# Patient Record
Sex: FEMALE | Race: WHITE | HISPANIC OR LATINO | Employment: PART TIME | URBAN - METROPOLITAN AREA
[De-identification: names, ages, dates, MRNs, and addresses within clinical notes are randomized per-mention and may not be internally consistent; named-entity substitution may affect disease eponyms.]

---

## 2021-12-22 ENCOUNTER — OFFICE VISIT (OUTPATIENT)
Dept: FAMILY MEDICINE CLINIC | Facility: CLINIC | Age: 36
End: 2021-12-22

## 2021-12-22 ENCOUNTER — LAB (OUTPATIENT)
Dept: LAB | Facility: HOSPITAL | Age: 36
End: 2021-12-22

## 2021-12-22 VITALS
HEART RATE: 79 BPM | BODY MASS INDEX: 41.09 KG/M2 | TEMPERATURE: 98.3 F | OXYGEN SATURATION: 97 % | HEIGHT: 69 IN | DIASTOLIC BLOOD PRESSURE: 81 MMHG | WEIGHT: 277.4 LBS | SYSTOLIC BLOOD PRESSURE: 129 MMHG

## 2021-12-22 DIAGNOSIS — Z11.59 NEED FOR HEPATITIS C SCREENING TEST: ICD-10-CM

## 2021-12-22 DIAGNOSIS — M05.79 RHEUMATOID ARTHRITIS INVOLVING MULTIPLE SITES WITH POSITIVE RHEUMATOID FACTOR (HCC): ICD-10-CM

## 2021-12-22 DIAGNOSIS — Z76.89 ESTABLISHING CARE WITH NEW DOCTOR, ENCOUNTER FOR: Primary | ICD-10-CM

## 2021-12-22 DIAGNOSIS — E11.9 TYPE 2 DIABETES MELLITUS WITHOUT COMPLICATION, WITHOUT LONG-TERM CURRENT USE OF INSULIN (HCC): ICD-10-CM

## 2021-12-22 DIAGNOSIS — Z13.29 SCREENING FOR THYROID DISORDER: ICD-10-CM

## 2021-12-22 DIAGNOSIS — Z01.89 ROUTINE LAB DRAW: ICD-10-CM

## 2021-12-22 DIAGNOSIS — J30.2 SEASONAL ALLERGIES: ICD-10-CM

## 2021-12-22 LAB
ALBUMIN SERPL-MCNC: 4.4 G/DL (ref 3.5–5.2)
ALBUMIN UR-MCNC: <1.2 MG/DL
ALBUMIN/GLOB SERPL: 1.7 G/DL
ALP SERPL-CCNC: 62 U/L (ref 39–117)
ALT SERPL W P-5'-P-CCNC: 42 U/L (ref 1–33)
ANION GAP SERPL CALCULATED.3IONS-SCNC: 9.3 MMOL/L (ref 5–15)
AST SERPL-CCNC: 22 U/L (ref 1–32)
BASOPHILS # BLD AUTO: 0.07 10*3/MM3 (ref 0–0.2)
BASOPHILS NFR BLD AUTO: 0.8 % (ref 0–1.5)
BILIRUB SERPL-MCNC: 0.5 MG/DL (ref 0–1.2)
BUN SERPL-MCNC: 11 MG/DL (ref 6–20)
BUN/CREAT SERPL: 14.7 (ref 7–25)
CALCIUM SPEC-SCNC: 9.3 MG/DL (ref 8.6–10.5)
CHLORIDE SERPL-SCNC: 105 MMOL/L (ref 98–107)
CHOLEST SERPL-MCNC: 155 MG/DL (ref 0–200)
CO2 SERPL-SCNC: 23.7 MMOL/L (ref 22–29)
CREAT SERPL-MCNC: 0.75 MG/DL (ref 0.57–1)
CREAT UR-MCNC: 144.4 MG/DL
DEPRECATED RDW RBC AUTO: 40.6 FL (ref 37–54)
EOSINOPHIL # BLD AUTO: 0.17 10*3/MM3 (ref 0–0.4)
EOSINOPHIL NFR BLD AUTO: 1.9 % (ref 0.3–6.2)
ERYTHROCYTE [DISTWIDTH] IN BLOOD BY AUTOMATED COUNT: 13.1 % (ref 12.3–15.4)
GFR SERPL CREATININE-BSD FRML MDRD: 87 ML/MIN/1.73
GLOBULIN UR ELPH-MCNC: 2.6 GM/DL
GLUCOSE SERPL-MCNC: 83 MG/DL (ref 65–99)
HBA1C MFR BLD: 5.49 % (ref 4.8–5.6)
HCT VFR BLD AUTO: 43 % (ref 34–46.6)
HDLC SERPL-MCNC: 51 MG/DL (ref 40–60)
HGB BLD-MCNC: 14.7 G/DL (ref 12–15.9)
IMM GRANULOCYTES # BLD AUTO: 0.03 10*3/MM3 (ref 0–0.05)
IMM GRANULOCYTES NFR BLD AUTO: 0.3 % (ref 0–0.5)
LDLC SERPL CALC-MCNC: 87 MG/DL (ref 0–100)
LDLC/HDLC SERPL: 1.67 {RATIO}
LYMPHOCYTES # BLD AUTO: 2.54 10*3/MM3 (ref 0.7–3.1)
LYMPHOCYTES NFR BLD AUTO: 28.1 % (ref 19.6–45.3)
MCH RBC QN AUTO: 29.5 PG (ref 26.6–33)
MCHC RBC AUTO-ENTMCNC: 34.2 G/DL (ref 31.5–35.7)
MCV RBC AUTO: 86.2 FL (ref 79–97)
MICROALBUMIN/CREAT UR: NORMAL MG/G{CREAT}
MONOCYTES # BLD AUTO: 0.5 10*3/MM3 (ref 0.1–0.9)
MONOCYTES NFR BLD AUTO: 5.5 % (ref 5–12)
NEUTROPHILS NFR BLD AUTO: 5.73 10*3/MM3 (ref 1.7–7)
NEUTROPHILS NFR BLD AUTO: 63.4 % (ref 42.7–76)
NRBC BLD AUTO-RTO: 0 /100 WBC (ref 0–0.2)
PLATELET # BLD AUTO: 292 10*3/MM3 (ref 140–450)
PMV BLD AUTO: 10.7 FL (ref 6–12)
POTASSIUM SERPL-SCNC: 4.6 MMOL/L (ref 3.5–5.2)
PROT SERPL-MCNC: 7 G/DL (ref 6–8.5)
RBC # BLD AUTO: 4.99 10*6/MM3 (ref 3.77–5.28)
SODIUM SERPL-SCNC: 138 MMOL/L (ref 136–145)
TRIGL SERPL-MCNC: 93 MG/DL (ref 0–150)
TSH SERPL DL<=0.05 MIU/L-ACNC: 2.15 UIU/ML (ref 0.27–4.2)
VLDLC SERPL-MCNC: 17 MG/DL (ref 5–40)
WBC NRBC COR # BLD: 9.04 10*3/MM3 (ref 3.4–10.8)

## 2021-12-22 PROCEDURE — 99204 OFFICE O/P NEW MOD 45 MIN: CPT | Performed by: NURSE PRACTITIONER

## 2021-12-22 PROCEDURE — 80061 LIPID PANEL: CPT

## 2021-12-22 PROCEDURE — 80050 GENERAL HEALTH PANEL: CPT | Performed by: NURSE PRACTITIONER

## 2021-12-22 PROCEDURE — 36415 COLL VENOUS BLD VENIPUNCTURE: CPT

## 2021-12-22 PROCEDURE — 83036 HEMOGLOBIN GLYCOSYLATED A1C: CPT

## 2021-12-22 PROCEDURE — 82570 ASSAY OF URINE CREATININE: CPT

## 2021-12-22 PROCEDURE — 82043 UR ALBUMIN QUANTITATIVE: CPT

## 2021-12-22 PROCEDURE — 86803 HEPATITIS C AB TEST: CPT

## 2021-12-22 RX ORDER — HYDROXYCHLOROQUINE SULFATE 200 MG/1
400 TABLET, FILM COATED ORAL DAILY
COMMUNITY
End: 2021-12-22

## 2021-12-22 RX ORDER — HYDROXYCHLOROQUINE SULFATE 200 MG/1
400 TABLET, FILM COATED ORAL DAILY
Qty: 180 TABLET | Refills: 0 | Status: SHIPPED | OUTPATIENT
Start: 2021-12-22 | End: 2022-03-28 | Stop reason: SDUPTHER

## 2021-12-22 RX ORDER — FOLIC ACID 1 MG/1
2 TABLET ORAL DAILY
COMMUNITY
End: 2022-03-28 | Stop reason: SDUPTHER

## 2021-12-22 RX ORDER — HYDROXYCHLOROQUINE SULFATE 400 MG/1
400 TABLET ORAL DAILY
COMMUNITY
End: 2021-12-22 | Stop reason: SDUPTHER

## 2021-12-22 NOTE — PROGRESS NOTES
Chief Complaint  Establish Care, Arthritis, and Diabetes    Subjective          Paige Crandall presents to [facility]  History of Present Illness    New patient to establish primary care.  Patient recently moved here from [place].    PMH:    Rheumatoid Arthritis:  Patient is taking Methotrexate, Hydroxychloroquine with good control of symptoms.  Patient needs referral to new Rheumatology.  Patient's previous Rheumatologist was Dr. Lexi Wright in [place], [place].    Diabetes Mellitus:  Patient is diet controlled.  Patient went through a weight management program and was able to get her A1c down to 5.9% in February 2021. Pt was previously on metformin in the past. She has been monitoring her blood glucose in the AM and it was in the 90s.     Seasonal Allergies: No medications    Pt has not had recent pap test.     Past Medical History:   Diagnosis Date   • Allergic    • Arthritis    • Diabetes mellitus (HCC)          Allergies   Allergen Reactions   • Estrogens Unknown - Low Severity          History reviewed. No pertinent surgical history.       Social History     Tobacco Use   • Smoking status: Never Smoker   • Smokeless tobacco: Never Used   Substance Use Topics   • Alcohol use: Yes     Comment: Rarely         Family History   Problem Relation Age of Onset   • Diabetes Mother    • Diabetes Father           Current Outpatient Medications on File Prior to Visit   Medication Sig   • folic acid (FOLVITE) 1 MG tablet Take 2 mg by mouth Daily.   • MAGNESIUM CHLORIDE PO Take 2 tablets by mouth 2 (Two) Times a Day.   • methotrexate 2.5 MG tablet Take 12.5 mg by mouth 1 (One) Time Per Week. Take 5 tablets (12.5 mg) by mouth weekly     No current facility-administered medications on file prior to visit.           There is no immunization history on file for this patient.      /81 (BP Location: Left arm, Patient Position: Sitting)   Pulse 79   Temp 98.3 °F (36.8 °C) (Oral)   Ht 175.3 cm  "(69\")   Wt 126 kg (277 lb 6.4 oz)   SpO2 97%   BMI 40.96 kg/m²             Physical Exam  Vitals reviewed.   Constitutional:       Appearance: Normal appearance. She is well-developed.   HENT:      Head: Normocephalic and atraumatic.      Right Ear: External ear normal.      Left Ear: External ear normal.      Mouth/Throat:      Pharynx: No oropharyngeal exudate.   Eyes:      Conjunctiva/sclera: Conjunctivae normal.      Pupils: Pupils are equal, round, and reactive to light.   Cardiovascular:      Rate and Rhythm: Normal rate and regular rhythm.      Heart sounds: No murmur heard.  No friction rub. No gallop.    Pulmonary:      Effort: Pulmonary effort is normal.      Breath sounds: Normal breath sounds. No wheezing or rhonchi.   Skin:     General: Skin is warm and dry.   Neurological:      Mental Status: She is alert and oriented to person, place, and time.      Cranial Nerves: No cranial nerve deficit.   Psychiatric:         Mood and Affect: Mood and affect normal.         Behavior: Behavior normal.         Thought Content: Thought content normal.         Judgment: Judgment normal.             Result Review :                           Assessment and Plan      Diagnoses and all orders for this visit:    1. Establishing care with new doctor, encounter for (Primary)  -     CBC Auto Differential    2. Type 2 diabetes mellitus without complication, without long-term current use of insulin (HCC)  -     Comprehensive Metabolic Panel; Future  -     Lipid Panel; Future  -     Hemoglobin A1c; Future  -     Microalbumin / Creatinine Urine Ratio - Urine, Clean Catch; Future    3. Rheumatoid arthritis involving multiple sites with positive rheumatoid factor (HCC)  -     Comprehensive Metabolic Panel; Future  -     Ambulatory Referral to Rheumatology  -     hydroxychloroquine (PLAQUENIL) 200 MG tablet; Take 2 tablets by mouth Daily for 121 days. Take two tabs by mouth daily  Indications: Rheumatoid Arthritis  Dispense: 180 " tablet; Refill: 0    4. Seasonal allergies    5. Screening for thyroid disorder  -     TSH; Future    6. Need for hepatitis C screening test  -     Hepatitis C antibody; Future    7. Routine lab draw              Follow Up     Return in about 3 months (around 3/22/2022).    Patient was given instructions and counseling regarding her condition or for health maintenance advice. Please see specific information pulled into the AVS if appropriate.

## 2021-12-23 ENCOUNTER — TELEPHONE (OUTPATIENT)
Dept: FAMILY MEDICINE CLINIC | Facility: CLINIC | Age: 36
End: 2021-12-23

## 2021-12-23 LAB — HCV AB SER DONR QL: NORMAL

## 2021-12-23 NOTE — TELEPHONE ENCOUNTER
----- Message from MICHAEL Hope sent at 12/23/2021  9:48 AM EST -----  ALT slightly elevated, probably due to fatty liver.  Continue weight loss and diet control.

## 2022-03-28 ENCOUNTER — OFFICE VISIT (OUTPATIENT)
Dept: FAMILY MEDICINE CLINIC | Facility: CLINIC | Age: 37
End: 2022-03-28

## 2022-03-28 VITALS
TEMPERATURE: 98.4 F | HEART RATE: 80 BPM | DIASTOLIC BLOOD PRESSURE: 96 MMHG | SYSTOLIC BLOOD PRESSURE: 137 MMHG | OXYGEN SATURATION: 97 % | RESPIRATION RATE: 12 BRPM

## 2022-03-28 DIAGNOSIS — M05.79 RHEUMATOID ARTHRITIS INVOLVING MULTIPLE SITES WITH POSITIVE RHEUMATOID FACTOR: Primary | ICD-10-CM

## 2022-03-28 PROCEDURE — 99213 OFFICE O/P EST LOW 20 MIN: CPT | Performed by: NURSE PRACTITIONER

## 2022-03-28 RX ORDER — HYDROXYCHLOROQUINE SULFATE 200 MG/1
400 TABLET, FILM COATED ORAL DAILY
Qty: 30 TABLET | Refills: 0 | Status: SHIPPED | OUTPATIENT
Start: 2022-03-28 | End: 2022-04-13 | Stop reason: SDUPTHER

## 2022-03-28 RX ORDER — FOLIC ACID 1 MG/1
2 TABLET ORAL DAILY
Qty: 30 TABLET | Refills: 0 | Status: SHIPPED | OUTPATIENT
Start: 2022-03-28 | End: 2022-04-20 | Stop reason: SDUPTHER

## 2022-03-28 NOTE — PROGRESS NOTES
Chief Complaint  Rheumatoid Arthritis    Subjective          Paige Read presents to [facility]  History of Present Illness    Rheumatoid Arthritis:  Patient is taking Plaquenil, Methotrexate.  Patient was referred to see Rheumatology but has not been able to get an appointment yet since the provider she was referred to does not take her insurance.  Patient is needing refills on her RA medications.      Patient is requesting an exemption from getting the Covid vaccine.  She is starting a new job in April that requires her to get the vaccine or a letter of exemption.  Patient does not want to get the vaccine while she is not under the care of Rheumatology in case she has a flare.    Past Medical History:   Diagnosis Date   • Allergic    • Arthritis    • Asthma    • Diabetes mellitus (HCC)    • GERD (gastroesophageal reflux disease)    • HL (hearing loss)    • Hypertension    • Obesity    • Pneumonia          Allergies   Allergen Reactions   • Estrogens Unknown - Low Severity          History reviewed. No pertinent surgical history.       Social History     Tobacco Use   • Smoking status: Never Smoker   • Smokeless tobacco: Never Used   Substance Use Topics   • Alcohol use: Yes     Comment: Once every 2-3 months.         Family History   Problem Relation Age of Onset   • Diabetes Mother    • Arthritis Mother    • Other Mother         Degenerative Disc Disease   • Diabetes Father    • Hearing loss Father    • Hyperlipidemia Father    • Alcohol abuse Maternal Grandfather    • Cancer Maternal Grandfather         Lung   • Diabetes Maternal Grandfather    • Early death Maternal Grandfather         Lung Cancer from smoking   • Stroke Maternal Grandmother    • Hearing loss Paternal Grandfather    • Heart disease Paternal Grandfather    • Hyperlipidemia Paternal Grandfather    • Cancer Paternal Grandmother         Breast   • Hyperlipidemia Paternal Grandmother    • Vision loss Paternal  Grandmother    • Alcohol abuse Maternal Uncle    • Mental illness Maternal Uncle    • Alcohol abuse Maternal Aunt    • Asthma Maternal Aunt    • Depression Maternal Aunt    • Drug abuse Maternal Aunt    • Liver disease Maternal Aunt    • Mental illness Maternal Aunt    • Thyroid disease Maternal Aunt    • Other Maternal Aunt         antiphospholipid antibody syndrome   • Developmental Disability Paternal Aunt         Born with cerebral palsy   • Miscarriages / Stillbirths Paternal Aunt    • Miscarriages / Stillbirths Sister    • Other Maternal Uncle         Degenerative Disc Disease          Current Outpatient Medications on File Prior to Visit   Medication Sig   • MAGNESIUM CHLORIDE PO Take 2 tablets by mouth 2 (Two) Times a Day.   • VITAMIN B COMPLEX-C PO Take 1 tablet by mouth Daily.   • [DISCONTINUED] folic acid (FOLVITE) 1 MG tablet Take 2 mg by mouth Daily.   • [DISCONTINUED] hydroxychloroquine (PLAQUENIL) 200 MG tablet Take 2 tablets by mouth Daily for 121 days. Take two tabs by mouth daily  Indications: Rheumatoid Arthritis   • [DISCONTINUED] methotrexate 2.5 MG tablet Take 12.5 mg by mouth 1 (One) Time Per Week. Take 5 tablets (12.5 mg) by mouth weekly     No current facility-administered medications on file prior to visit.         Immunization History   Administered Date(s) Administered   • Hepatitis A 11/29/2005   • Hepatitis B 11/07/1997, 01/07/1998, 11/05/1998   • IPV 11/29/2005   • MMR 09/20/1985, 11/07/1997   • Meningococcal Polysaccharide 11/29/2005   • Typhoid, Unspecified 11/29/2005   • Yellow Fever 01/24/1995, 11/29/2005, 12/20/2005         /96 (BP Location: Left arm)   Pulse 80   Temp 98.4 °F (36.9 °C) (Oral)   Resp 12   SpO2 97%             Physical Exam  Vitals reviewed.   Constitutional:       Appearance: Normal appearance. She is well-developed.   HENT:      Head: Normocephalic and atraumatic.      Right Ear: External ear normal.      Left Ear: External ear normal.       Mouth/Throat:      Pharynx: No oropharyngeal exudate.   Eyes:      Conjunctiva/sclera: Conjunctivae normal.      Pupils: Pupils are equal, round, and reactive to light.   Cardiovascular:      Rate and Rhythm: Normal rate and regular rhythm.      Heart sounds: No murmur heard.    No friction rub. No gallop.   Pulmonary:      Effort: Pulmonary effort is normal.      Breath sounds: Normal breath sounds. No wheezing or rhonchi.   Skin:     General: Skin is warm and dry.   Neurological:      Mental Status: She is alert and oriented to person, place, and time.      Cranial Nerves: No cranial nerve deficit.   Psychiatric:         Mood and Affect: Mood and affect normal.         Behavior: Behavior normal.         Thought Content: Thought content normal.         Judgment: Judgment normal.             Result Review :                           Assessment and Plan      Diagnoses and all orders for this visit:    1. Rheumatoid arthritis involving multiple sites with positive rheumatoid factor (HCC) (Primary)  Comments:  will re-refer to rheumatology, cont current medications  Orders:  -     methotrexate 2.5 MG tablet; Take 5 tablets by mouth 1 (One) Time Per Week. Take 5 tablets (12.5 mg) by mouth weekly  Dispense: 4 tablet; Refill: 0  -     hydroxychloroquine (PLAQUENIL) 200 MG tablet; Take 2 tablets by mouth Daily for 121 days. Take two tabs by mouth daily  Indications: Rheumatoid Arthritis  Dispense: 30 tablet; Refill: 0  -     folic acid (FOLVITE) 1 MG tablet; Take 2 tablets by mouth Daily.  Dispense: 30 tablet; Refill: 0              Follow Up     Return in about 6 months (around 9/28/2022).    Patient was given instructions and counseling regarding her condition or for health maintenance advice. Please see specific information pulled into the AVS if appropriate.

## 2022-03-30 ENCOUNTER — TELEPHONE (OUTPATIENT)
Dept: FAMILY MEDICINE CLINIC | Facility: CLINIC | Age: 37
End: 2022-03-30

## 2022-03-30 DIAGNOSIS — M05.79 RHEUMATOID ARTHRITIS INVOLVING MULTIPLE SITES WITH POSITIVE RHEUMATOID FACTOR: ICD-10-CM

## 2022-03-30 NOTE — TELEPHONE ENCOUNTER
Caller: Paige Crandall    Relationship: Self    Best call back number: [phone]    Requested Prescriptions:   methotrexate 2.5 MG tablet    Requested Prescriptions      No prescriptions requested or ordered in this encounter        Pharmacy where request should be sent: [facility]/PHARMACY #[id number] - [place], [place] - [address] [facility] - [phone]  - [phone] FX     Additional details provided by patient: PATIENT HAS ENOUGH FOR ONE MORE WEEK AND STATES THAT PCP WAS SUPPOSED TO CALL IT IN TO THE PHARMACY      Does the patient have less than a 3 day supply:  [] Yes  [x] No    Ene Briggs Rep   03/30/22 10:41 EDT

## 2022-03-30 NOTE — TELEPHONE ENCOUNTER
Caller: Paige Crandall    Relationship: Self    Best call back number: [phone]    What is the best time to reach you: ANY    Who are you requesting to speak with (clinical staff, provider,  specific staff member): CLINICAL    What was the call regarding: PATIENT WENT TO [facility] AND WAS TOLD THERE WAS AN ERROR WITH THE PRESCRIPTION. SHE IS SUPPOSED TO TAKE 5 TABLETS (TOTAL 12.5MG) BUT SCRIPT IS WRITTEN FOR 4 TABLETS.     Do you require a callback: YES

## 2022-04-13 DIAGNOSIS — M05.79 RHEUMATOID ARTHRITIS INVOLVING MULTIPLE SITES WITH POSITIVE RHEUMATOID FACTOR: ICD-10-CM

## 2022-04-13 RX ORDER — HYDROXYCHLOROQUINE SULFATE 200 MG/1
400 TABLET, FILM COATED ORAL DAILY
Qty: 90 TABLET | Refills: 0 | Status: SHIPPED | OUTPATIENT
Start: 2022-04-13 | End: 2022-05-10

## 2022-04-13 NOTE — TELEPHONE ENCOUNTER
Caller: Paige Crandall    Relationship: Self    Best call back number: [phone]    Requested Prescriptions:   Requested Prescriptions     Pending Prescriptions Disp Refills   • methotrexate 2.5 MG tablet 5 tablet 0     Sig: Take 5 tablets by mouth 1 (One) Time Per Week. Take 5 tablets (12.5 mg) by mouth weekly   • hydroxychloroquine (PLAQUENIL) 200 MG tablet 30 tablet 0     Sig: Take 2 tablets by mouth Daily for 121 days. Take two tabs by mouth daily  Indications: Rheumatoid Arthritis        Pharmacy where request should be sent: [facility]/PHARMACY #[id number] - [place], [place] - [address] [facility] - [phone]  - [phone] FX     Additional details provided by patient: PHARMACY ADVISED THAT THIS OFFICE WOULD NOT ACCEPT CALLS FROM THEM AND PATIENT WOULD NEED TO CALL TO GET REFILLS, PHARMACY ALSO TOLD PATIENT THAT THE PRESCRIPTION FOR THE METHOTREXATE WAS ONLY FOR FOUR TABLETS, PLEASE CONTACT PATIENT WHEN PRESCRIPTIONS HAVE BEEN SENT     Does the patient have less than a 3 day supply:  [x] Yes  [] No    Ene Naylor Rep   04/13/22 12:11 EDT

## 2022-04-20 DIAGNOSIS — M05.79 RHEUMATOID ARTHRITIS INVOLVING MULTIPLE SITES WITH POSITIVE RHEUMATOID FACTOR: ICD-10-CM

## 2022-04-20 RX ORDER — FOLIC ACID 1 MG/1
2 TABLET ORAL DAILY
Qty: 60 TABLET | Refills: 2 | Status: SHIPPED | OUTPATIENT
Start: 2022-04-20 | End: 2022-07-12

## 2022-04-20 NOTE — TELEPHONE ENCOUNTER
Caller: Paige Crandall    Relationship: Self    Best call back number: [phone]    Requested Prescriptions:   Requested Prescriptions     Pending Prescriptions Disp Refills   • folic acid (FOLVITE) 1 MG tablet 30 tablet 0     Sig: Take 2 tablets by mouth Daily.   • methotrexate 2.5 MG tablet 20 tablet 2     Sig: Take 5 tablets by mouth 1 (One) Time Per Week. Take 5 tablets (12.5 mg) by mouth weekly   ALSO:  DICLOFENAC  50 MG, 1 TAB AS NEEDED UP TOP 3 TIMES A DAY     Pharmacy where request should be sent: [facility]/PHARMACY #[id number] - [place], [place] - [address] [facility] - [phone]  - [phone] FX     Additional details provided by patient: PATIENT STATED: OUT OF METHOTREXATE, REQUESTING ALL THESE MEDICATIONS BE WRITTEN AS A MONTH SUPPLY WITH REFILL, HAS RHEUMATOLOGY APPOINTMENT IN 8/2022 AND WILL NEED PCP TO FILL TILL THAT TIME     Does the patient have less than a 3 day supply:  [x] Yes  [] No    Ene BAINS Rep   04/20/22 11:16 EDT

## 2022-05-10 DIAGNOSIS — M05.79 RHEUMATOID ARTHRITIS INVOLVING MULTIPLE SITES WITH POSITIVE RHEUMATOID FACTOR: ICD-10-CM

## 2022-05-10 RX ORDER — HYDROXYCHLOROQUINE SULFATE 200 MG/1
400 TABLET, FILM COATED ORAL DAILY
Qty: 60 TABLET | Refills: 1 | Status: SHIPPED | OUTPATIENT
Start: 2022-05-10 | End: 2022-07-11

## 2022-06-24 DIAGNOSIS — M05.79 RHEUMATOID ARTHRITIS INVOLVING MULTIPLE SITES WITH POSITIVE RHEUMATOID FACTOR: ICD-10-CM

## 2022-06-29 ENCOUNTER — LAB (OUTPATIENT)
Dept: LAB | Facility: HOSPITAL | Age: 37
End: 2022-06-29

## 2022-06-29 ENCOUNTER — OFFICE VISIT (OUTPATIENT)
Dept: FAMILY MEDICINE CLINIC | Facility: CLINIC | Age: 37
End: 2022-06-29

## 2022-06-29 VITALS
OXYGEN SATURATION: 98 % | WEIGHT: 269.2 LBS | HEIGHT: 69 IN | SYSTOLIC BLOOD PRESSURE: 124 MMHG | TEMPERATURE: 97.5 F | HEART RATE: 78 BPM | DIASTOLIC BLOOD PRESSURE: 75 MMHG | BODY MASS INDEX: 39.87 KG/M2

## 2022-06-29 DIAGNOSIS — R05.9 COUGH: ICD-10-CM

## 2022-06-29 DIAGNOSIS — R05.9 COUGH: Primary | ICD-10-CM

## 2022-06-29 PROCEDURE — 86480 TB TEST CELL IMMUN MEASURE: CPT

## 2022-06-29 PROCEDURE — 86606 ASPERGILLUS ANTIBODY: CPT

## 2022-06-29 PROCEDURE — 86612 BLASTOMYCES ANTIBODY: CPT

## 2022-06-29 PROCEDURE — 99213 OFFICE O/P EST LOW 20 MIN: CPT | Performed by: NURSE PRACTITIONER

## 2022-06-29 PROCEDURE — 36415 COLL VENOUS BLD VENIPUNCTURE: CPT

## 2022-06-29 RX ORDER — MULTIPLE VITAMINS W/ MINERALS TAB 9MG-400MCG
1 TAB ORAL DAILY
COMMUNITY

## 2022-06-29 NOTE — PROGRESS NOTES
Chief Complaint  Cough    Subjective          Paige Crandall presents to [facility]  History of Present Illness    Patient complaining of continuing productive cough - green, since having Covid 19, diagnosed on 6/4/22.  Patient states cough symptoms started 2 days prior to getting Covid 19.  Patient notes she has a history of seasonal allergies but does not take allergy medications.  Patient states she was exposed to Mono through her nephew and wants to be tested, she denies kissing or sharing drinks with him.  Patient is requesting testing for Toxoplasmosis because she used to work at a vet office.  Patient requesting testing for TB because she worked with a patient that was a r/o TB at the hospital.  She has not been notified that the patient was positive or negative.    Past Medical History:   Diagnosis Date   • Allergic    • Arthritis    • Asthma    • Diabetes mellitus (HCC)    • GERD (gastroesophageal reflux disease)    • HL (hearing loss)    • Hypertension    • Obesity    • Pneumonia          Allergies   Allergen Reactions   • Estrogens Unknown - Low Severity          History reviewed. No pertinent surgical history.       Social History     Tobacco Use   • Smoking status: Never Smoker   • Smokeless tobacco: Never Used   Substance Use Topics   • Alcohol use: Yes     Comment: Once every 2-3 months.         Family History   Problem Relation Age of Onset   • Diabetes Mother    • Arthritis Mother    • Other Mother         Degenerative Disc Disease   • Diabetes Father    • Hearing loss Father    • Hyperlipidemia Father    • Alcohol abuse Maternal Grandfather    • Cancer Maternal Grandfather         Lung   • Diabetes Maternal Grandfather    • Early death Maternal Grandfather         Lung Cancer from smoking   • Stroke Maternal Grandmother    • Hearing loss Paternal Grandfather    • Heart disease Paternal Grandfather    • Hyperlipidemia Paternal Grandfather    • Cancer Paternal  Grandmother         Breast   • Hyperlipidemia Paternal Grandmother    • Vision loss Paternal Grandmother    • Alcohol abuse Maternal Uncle    • Mental illness Maternal Uncle    • Alcohol abuse Maternal Aunt    • Asthma Maternal Aunt    • Depression Maternal Aunt    • Drug abuse Maternal Aunt    • Liver disease Maternal Aunt    • Mental illness Maternal Aunt    • Thyroid disease Maternal Aunt    • Other Maternal Aunt         antiphospholipid antibody syndrome   • Developmental Disability Paternal Aunt         Born with cerebral palsy   • Miscarriages / Stillbirths Paternal Aunt    • Miscarriages / Stillbirths Sister    • Other Maternal Uncle         Degenerative Disc Disease          Current Outpatient Medications on File Prior to Visit   Medication Sig   • diclofenac (VOLTAREN) 50 MG EC tablet Take 1 tablet by mouth 3 (Three) Times a Day.   • folic acid (FOLVITE) 1 MG tablet Take 2 tablets by mouth Daily.   • hydroxychloroquine (PLAQUENIL) 200 MG tablet TAKE 2 TABLETS BY MOUTH DAILY  DAYS. TAKE TWO TABS BY MOUTH DAILY INDICATIONS: RHEUMATOID ARTHRITIS   • MAGNESIUM CHLORIDE PO Take 2 tablets by mouth 2 (Two) Times a Day.   • Misc Natural Products (ELDERBERRY IMMUNE COMPLEX PO) Take 1 tablet by mouth Daily.   • multivitamin with minerals (HAIR SKIN AND NAILS FORMULA PO) Take 1 tablet by mouth Daily.   • methotrexate 2.5 MG tablet Take 5 tablets by mouth 1 (One) Time Per Week. Take 5 tablets (12.5 mg) by mouth weekly   • [DISCONTINUED] coenzyme Q10 50 MG capsule capsule Take  by mouth Daily.   • [DISCONTINUED] fluticasone (FLONASE) 50 MCG/ACT nasal spray 1 spray by Each Nare route Daily As Needed for Rhinitis or Allergies for up to 30 days.   • [DISCONTINUED] VITAMIN B COMPLEX-C PO Take 1 tablet by mouth Daily.     No current facility-administered medications on file prior to visit.         Immunization History   Administered Date(s) Administered   • Hepatitis A 11/29/2005   • Hepatitis B 11/07/1997,  "01/07/1998, 11/05/1998   • IPV 11/29/2005   • MMR 09/20/1985, 11/07/1997   • Meningococcal Polysaccharide 11/29/2005   • Typhoid, Unspecified 11/29/2005   • Yellow Fever 01/24/1995, 11/29/2005, 12/20/2005         /75 (BP Location: Left arm, Patient Position: Sitting, Cuff Size: Adult)   Pulse 78   Temp 97.5 °F (36.4 °C) (Oral)   Ht 175.3 cm (69\")   Wt 122 kg (269 lb 3.2 oz)   SpO2 98%   BMI 39.75 kg/m²             Physical Exam  Vitals reviewed.   Constitutional:       Appearance: Normal appearance. She is well-developed.   HENT:      Head: Normocephalic and atraumatic.      Right Ear: External ear normal.      Left Ear: External ear normal.      Ears:      Comments: TMs dull bilaterally     Mouth/Throat:      Pharynx: No oropharyngeal exudate.   Eyes:      Conjunctiva/sclera: Conjunctivae normal.      Pupils: Pupils are equal, round, and reactive to light.   Cardiovascular:      Rate and Rhythm: Normal rate and regular rhythm.      Heart sounds: No murmur heard.    No friction rub. No gallop.   Pulmonary:      Effort: Pulmonary effort is normal.      Breath sounds: Normal breath sounds. No wheezing or rhonchi.   Skin:     General: Skin is warm and dry.   Neurological:      Mental Status: She is alert and oriented to person, place, and time.      Cranial Nerves: No cranial nerve deficit.   Psychiatric:         Mood and Affect: Mood and affect normal.         Behavior: Behavior normal.         Thought Content: Thought content normal.         Judgment: Judgment normal.             Result Review :     The following data was reviewed by: MICHAEL Hope on 06/29/2022:        Data reviewed: Radiologic studies Chest x-ray dated May 2022              Assessment and Plan      Diagnoses and all orders for this visit:    1. Cough (Primary)  -     Fungal Antibodies, Quantitative Double Immunodiffusion; Future  -     CT Chest Without Contrast; Future  -     QuantiFERON TB Gold; Future  -     Ambulatory " Referral to ENT (Otolaryngology)              Follow Up     Return if symptoms worsen or fail to improve.    Patient was given instructions and counseling regarding her condition or for health maintenance advice. Please see specific information pulled into the AVS if appropriate.

## 2022-07-02 LAB
GAMMA INTERFERON BACKGROUND BLD IA-ACNC: 0 IU/ML
M TB IFN-G BLD-IMP: NEGATIVE
M TB IFN-G CD4+ BCKGRND COR BLD-ACNC: 0 IU/ML
M TB IFN-G CD4+CD8+ BCKGRND COR BLD-ACNC: 0.01 IU/ML
MITOGEN IGNF BLD-ACNC: >10 IU/ML
SERVICE CMNT-IMP: NORMAL

## 2022-07-03 LAB
A FLAVUS AB SER QL ID: NEGATIVE
A FUMIGATUS AB SER QL ID: NEGATIVE
A NIGER AB SER QL ID: NEGATIVE
B DERMAT AB TITR SER: NEGATIVE {TITER}

## 2022-07-05 ENCOUNTER — TELEPHONE (OUTPATIENT)
Dept: FAMILY MEDICINE CLINIC | Facility: CLINIC | Age: 37
End: 2022-07-05

## 2022-07-05 NOTE — TELEPHONE ENCOUNTER
----- Message from MICHAEL Hope sent at 7/5/2022  9:28 AM EDT -----  Negative culture and negative TB

## 2022-07-11 DIAGNOSIS — M05.79 RHEUMATOID ARTHRITIS INVOLVING MULTIPLE SITES WITH POSITIVE RHEUMATOID FACTOR: ICD-10-CM

## 2022-07-11 RX ORDER — HYDROXYCHLOROQUINE SULFATE 200 MG/1
400 TABLET, FILM COATED ORAL DAILY
Qty: 60 TABLET | Refills: 1 | Status: SHIPPED | OUTPATIENT
Start: 2022-07-11 | End: 2022-07-15 | Stop reason: SDUPTHER

## 2022-07-12 DIAGNOSIS — M05.79 RHEUMATOID ARTHRITIS INVOLVING MULTIPLE SITES WITH POSITIVE RHEUMATOID FACTOR: ICD-10-CM

## 2022-07-12 RX ORDER — FOLIC ACID 1 MG/1
2 TABLET ORAL DAILY
Qty: 60 TABLET | Refills: 2 | Status: SHIPPED | OUTPATIENT
Start: 2022-07-12 | End: 2022-07-15 | Stop reason: SDUPTHER

## 2022-07-14 ENCOUNTER — TELEPHONE (OUTPATIENT)
Dept: FAMILY MEDICINE CLINIC | Facility: CLINIC | Age: 37
End: 2022-07-14

## 2022-07-14 DIAGNOSIS — M05.79 RHEUMATOID ARTHRITIS INVOLVING MULTIPLE SITES WITH POSITIVE RHEUMATOID FACTOR: ICD-10-CM

## 2022-07-14 RX ORDER — HYDROXYCHLOROQUINE SULFATE 200 MG/1
400 TABLET, FILM COATED ORAL DAILY
Qty: 180 TABLET | Refills: 0 | Status: CANCELLED | OUTPATIENT
Start: 2022-07-14

## 2022-07-14 NOTE — TELEPHONE ENCOUNTER
Phoned patient. She is going to contact her insurance to see how much the CT was going to be. Also had questions about being referred to Allergy(Gave Martha the information for her to follow-up on this)

## 2022-07-14 NOTE — TELEPHONE ENCOUNTER
Caller: Hemanth Crandallley    Relationship: Self    Best call back number: [phone]    Requested Prescriptions:   Requested Prescriptions     Pending Prescriptions Disp Refills   • hydroxychloroquine (PLAQUENIL) 200 MG tablet  1     Sig: Take  by mouth Daily for 91 days.   • folic acid (FOLVITE) 1 MG tablet 60 tablet 2     Sig: Take 2 tablets by mouth Daily.        Pharmacy where request should be sent: [facility] - [place]       Does the patient have less than a 3 day supply:  [] Yes  [x] No    Ene BAIG Rep   07/14/22 10:00 EDT

## 2022-07-14 NOTE — TELEPHONE ENCOUNTER
I called patient regarding the requested referral to Allergy.  She is going to contact her ins to see who she can see and at what cost before getting referral as she has 'In Network Benefits Only' for her [place] plan.      Patient is also wanting to change her Rheumatology referral back to Dr. Ibrahim - she is currently scheduled with Dr. Graves on 8/16/22.  Patient previously tried to get referral to Dr. Ibrahim but she did not accept her Medicaid insurance.  I instructed patient to ask about whether or not Dr. Ibrahim or Dr. Graves are in network when she contacts her insurance.  Patient will call back after speaking to her insurance to let me know if she wants to pursue Allergy referral and whether or not we need to change her Rheumatology referral.

## 2022-07-15 DIAGNOSIS — M05.79 RHEUMATOID ARTHRITIS INVOLVING MULTIPLE SITES WITH POSITIVE RHEUMATOID FACTOR: ICD-10-CM

## 2022-07-15 RX ORDER — HYDROXYCHLOROQUINE SULFATE 200 MG/1
400 TABLET, FILM COATED ORAL DAILY
Qty: 60 TABLET | Refills: 1 | Status: CANCELLED | OUTPATIENT
Start: 2022-07-15 | End: 2022-10-14

## 2022-07-15 RX ORDER — FOLIC ACID 1 MG/1
2 TABLET ORAL DAILY
Qty: 60 TABLET | Refills: 2 | OUTPATIENT
Start: 2022-07-15 | End: 2022-07-15

## 2022-07-15 RX ORDER — FOLIC ACID 1 MG/1
2 TABLET ORAL DAILY
Qty: 180 TABLET | Refills: 2 | OUTPATIENT
Start: 2022-07-12

## 2022-07-15 RX ORDER — HYDROXYCHLOROQUINE SULFATE 200 MG/1
400 TABLET, FILM COATED ORAL DAILY
Qty: 120 TABLET | Refills: 0 | OUTPATIENT
Start: 2022-07-11 | End: 2022-09-28 | Stop reason: SDUPTHER

## 2022-07-18 RX ORDER — FOLIC ACID 1 MG/1
2 TABLET ORAL DAILY
Qty: 60 TABLET | Refills: 2 | OUTPATIENT
Start: 2022-07-18

## 2022-07-27 ENCOUNTER — TELEPHONE (OUTPATIENT)
Dept: FAMILY MEDICINE CLINIC | Facility: CLINIC | Age: 37
End: 2022-07-27

## 2022-08-01 DIAGNOSIS — R05.9 COUGH: Primary | ICD-10-CM

## 2022-08-03 ENCOUNTER — LAB (OUTPATIENT)
Dept: LAB | Facility: HOSPITAL | Age: 37
End: 2022-08-03

## 2022-08-03 DIAGNOSIS — R05.9 COUGH: ICD-10-CM

## 2022-08-03 PROCEDURE — 86777 TOXOPLASMA ANTIBODY: CPT

## 2022-08-03 PROCEDURE — 36415 COLL VENOUS BLD VENIPUNCTURE: CPT

## 2022-08-03 PROCEDURE — 86778 TOXOPLASMA ANTIBODY IGM: CPT

## 2022-08-04 LAB
LABORATORY COMMENT REPORT: NORMAL
T GONDII IGG SERPL IA-ACNC: <3 IU/ML (ref 0–7.1)
T GONDII IGM SER IA-ACNC: <3 AU/ML (ref 0–7.9)

## 2022-08-31 ENCOUNTER — TELEPHONE (OUTPATIENT)
Dept: FAMILY MEDICINE CLINIC | Facility: CLINIC | Age: 37
End: 2022-08-31

## 2022-09-22 DIAGNOSIS — M05.79 RHEUMATOID ARTHRITIS INVOLVING MULTIPLE SITES WITH POSITIVE RHEUMATOID FACTOR: ICD-10-CM

## 2022-09-22 RX ORDER — HYDROXYCHLOROQUINE SULFATE 200 MG/1
400 TABLET, FILM COATED ORAL DAILY
Qty: 120 TABLET | Refills: 0 | Status: CANCELLED | OUTPATIENT
Start: 2022-09-22 | End: 2022-12-22

## 2022-09-23 DIAGNOSIS — M05.79 RHEUMATOID ARTHRITIS INVOLVING MULTIPLE SITES WITH POSITIVE RHEUMATOID FACTOR: ICD-10-CM

## 2022-09-23 RX ORDER — HYDROXYCHLOROQUINE SULFATE 200 MG/1
400 TABLET, FILM COATED ORAL DAILY
Qty: 120 TABLET | Refills: 0 | Status: CANCELLED | OUTPATIENT
Start: 2022-09-22 | End: 2022-12-22

## 2022-09-27 DIAGNOSIS — M05.79 RHEUMATOID ARTHRITIS INVOLVING MULTIPLE SITES WITH POSITIVE RHEUMATOID FACTOR: ICD-10-CM

## 2022-09-28 ENCOUNTER — OFFICE VISIT (OUTPATIENT)
Dept: FAMILY MEDICINE CLINIC | Facility: CLINIC | Age: 37
End: 2022-09-28

## 2022-09-28 VITALS
DIASTOLIC BLOOD PRESSURE: 71 MMHG | BODY MASS INDEX: 39.16 KG/M2 | SYSTOLIC BLOOD PRESSURE: 113 MMHG | WEIGHT: 264.4 LBS | TEMPERATURE: 98.4 F | HEART RATE: 91 BPM | OXYGEN SATURATION: 98 % | HEIGHT: 69 IN

## 2022-09-28 DIAGNOSIS — Z01.89 ROUTINE LAB DRAW: ICD-10-CM

## 2022-09-28 DIAGNOSIS — Z13.29 SCREENING FOR THYROID DISORDER: ICD-10-CM

## 2022-09-28 DIAGNOSIS — M05.79 RHEUMATOID ARTHRITIS INVOLVING MULTIPLE SITES WITH POSITIVE RHEUMATOID FACTOR: Primary | ICD-10-CM

## 2022-09-28 DIAGNOSIS — R73.09 ELEVATED HEMOGLOBIN A1C: ICD-10-CM

## 2022-09-28 PROCEDURE — 99214 OFFICE O/P EST MOD 30 MIN: CPT | Performed by: NURSE PRACTITIONER

## 2022-09-28 RX ORDER — HYDROXYCHLOROQUINE SULFATE 200 MG/1
400 TABLET, FILM COATED ORAL DAILY
Qty: 120 TABLET | Refills: 0 | Status: CANCELLED | OUTPATIENT
Start: 2022-09-28

## 2022-09-28 RX ORDER — HYDROXYCHLOROQUINE SULFATE 200 MG/1
400 TABLET, FILM COATED ORAL DAILY
Qty: 120 TABLET | Refills: 0 | Status: SHIPPED | OUTPATIENT
Start: 2022-09-28 | End: 2022-10-12 | Stop reason: SDUPTHER

## 2022-09-28 NOTE — TELEPHONE ENCOUNTER
Patient has appt today.  Patient was to establish with Rheumatology for refills, previous appt cancelled d/t patient requesting to see a different provider.

## 2022-09-28 NOTE — PROGRESS NOTES
Chief Complaint  Follow-up (6 month), Diabetes, and Rheumatoid Arthritis    NINO Crandall presents to [facility]     Diabetes Mellitus, type 2: Patient is diet controlled. Patient is compliant with medications.  Patient's last A1c is 5.49% on 12/22/21. Patient does not monitor blood sugars at home.  Patient denies any unhealing sores. Patient attempts to monitor carbohydrate/ sugar intake in diet.     RA:  Patient is taking Hydroxychloroquine, Methotrexate, Diclofenac.  Patient has been referred to Rheumatology but is still waiting for an appointment.    History of Present Illness  Past Medical History:   Diagnosis Date   • Allergic    • Arthritis    • Asthma    • Diabetes mellitus (HCC)    • GERD (gastroesophageal reflux disease)    • HL (hearing loss)    • Hypertension    • Obesity    • Pneumonia       Family History   Problem Relation Age of Onset   • Diabetes Mother    • Arthritis Mother    • Other Mother         Degenerative Disc Disease   • Diabetes Father    • Hearing loss Father    • Hyperlipidemia Father    • Alcohol abuse Maternal Grandfather    • Cancer Maternal Grandfather         Lung   • Diabetes Maternal Grandfather    • Early death Maternal Grandfather         Lung Cancer from smoking   • Stroke Maternal Grandmother    • Hearing loss Paternal Grandfather    • Heart disease Paternal Grandfather    • Hyperlipidemia Paternal Grandfather    • Cancer Paternal Grandmother         Breast   • Hyperlipidemia Paternal Grandmother    • Vision loss Paternal Grandmother    • Alcohol abuse Maternal Uncle    • Mental illness Maternal Uncle    • Alcohol abuse Maternal Aunt    • Asthma Maternal Aunt    • Depression Maternal Aunt    • Drug abuse Maternal Aunt    • Liver disease Maternal Aunt    • Mental illness Maternal Aunt    • Thyroid disease Maternal Aunt    • Other Maternal Aunt         antiphospholipid antibody syndrome   • Developmental Disability Paternal Aunt         " Born with cerebral palsy   • Miscarriages / Stillbirths Paternal Aunt    • Miscarriages / Stillbirths Sister    • Other Maternal Uncle         Degenerative Disc Disease      History reviewed. No pertinent surgical history.     Current Outpatient Medications:   •  diclofenac (VOLTAREN) 50 MG EC tablet, Take 1 tablet by mouth 3 (Three) Times a Day., Disp: 90 tablet, Rfl: 0  •  folic acid (FOLVITE) 1 MG tablet, TAKE 2 TABLETS BY MOUTH DAILY, Disp: 180 tablet, Rfl: 2  •  MAGNESIUM CHLORIDE PO, Take 2 tablets by mouth 2 (Two) Times a Day., Disp: , Rfl:   •  methotrexate 2.5 MG tablet, Take 5 tablets (12.5 mg) by mouth weekly, Disp: 20 tablet, Rfl: 2  •  multivitamin with minerals tablet tablet, Take 1 tablet by mouth Daily., Disp: , Rfl:   •  hydroxychloroquine (PLAQUENIL) 200 MG tablet, Take 2 tablets by mouth Daily for 91 days. Indications: Rheumatoid Arthritis, Disp: 120 tablet, Rfl: 0    OBJECTIVE  Vital Signs:   /71 (BP Location: Left arm, Patient Position: Sitting, Cuff Size: Adult)   Pulse 91   Temp 98.4 °F (36.9 °C) (Oral)   Ht 175.3 cm (69\")   Wt 120 kg (264 lb 6.4 oz)   SpO2 98%   BMI 39.05 kg/m²    Estimated body mass index is 39.05 kg/m² as calculated from the following:    Height as of this encounter: 175.3 cm (69\").    Weight as of this encounter: 120 kg (264 lb 6.4 oz).     Wt Readings from Last 3 Encounters:   09/28/22 120 kg (264 lb 6.4 oz)   06/29/22 122 kg (269 lb 3.2 oz)   05/20/22 123 kg (272 lb 3.2 oz)     BP Readings from Last 3 Encounters:   09/28/22 113/71   06/29/22 124/75   05/20/22 138/90       Physical Exam  Vitals reviewed.   Constitutional:       Appearance: Normal appearance. She is well-developed.   HENT:      Head: Normocephalic and atraumatic.      Right Ear: External ear normal.      Left Ear: External ear normal.      Mouth/Throat:      Pharynx: No oropharyngeal exudate.   Eyes:      Conjunctiva/sclera: Conjunctivae normal.      Pupils: Pupils are equal, round, and " reactive to light.   Cardiovascular:      Rate and Rhythm: Normal rate and regular rhythm.      Heart sounds: No murmur heard.    No friction rub. No gallop.   Pulmonary:      Effort: Pulmonary effort is normal.      Breath sounds: Normal breath sounds. No wheezing or rhonchi.   Skin:     General: Skin is warm and dry.   Neurological:      Mental Status: She is alert and oriented to person, place, and time.      Cranial Nerves: No cranial nerve deficit.   Psychiatric:         Mood and Affect: Mood and affect normal.         Behavior: Behavior normal.         Thought Content: Thought content normal.         Judgment: Judgment normal.          Result Review        No Images in the past 120 days found..      The above data has been reviewed by MICHAEL Hope 09/28/2022 09:09 EDT.          Patient Care Team:  Shira Corley APRN as PCP - General (Family Medicine)    Class 2 Severe Obesity (BMI >=35 and <=39.9). Obesity-related health conditions include the following: none. Obesity is improving with lifestyle modifications. BMI is is above average; BMI management plan is completed. We discussed portion control and increasing exercise.       ASSESSMENT & PLAN    Diagnoses and all orders for this visit:    1. Rheumatoid arthritis involving multiple sites with positive rheumatoid factor (HCC) (Primary)  Comments:  will re-refer to rheumatology, cont current medications  Orders:  -     diclofenac (VOLTAREN) 50 MG EC tablet; Take 1 tablet by mouth 3 (Three) Times a Day.  Dispense: 90 tablet; Refill: 0  -     methotrexate 2.5 MG tablet; Take 5 tablets (12.5 mg) by mouth weekly  Dispense: 20 tablet; Refill: 2  -     Ambulatory Referral to Ophthalmology    2. Screening for thyroid disorder  -     TSH; Future    3. Elevated hemoglobin A1c  -     Comprehensive Metabolic Panel; Future  -     Lipid Panel; Future  -     Hemoglobin A1c; Future  -     Microalbumin / Creatinine Urine Ratio - Urine, Clean Catch; Future  -      CBC w AUTO Differential; Future  -     Ambulatory Referral to Ophthalmology       Patient has been erroneously marked as diabetic. Based on the available clinical information, she does not have diabetes and should therefore be excluded from diabetic health maintenance and quality measures for the remainder of the reporting period.  .  Tobacco Use: Low Risk    • Smoking Tobacco Use: Never Smoker   • Smokeless Tobacco Use: Never Used       Follow Up     Return in about 6 months (around 3/28/2023).      Patient was given instructions and counseling regarding her condition or for health maintenance advice. Please see specific information pulled into the AVS if appropriate.   I have reviewed information obtained and documented by others and I have confirmed the accuracy of this documented note.    Shira Corley, APRN

## 2022-09-30 ENCOUNTER — TELEPHONE (OUTPATIENT)
Dept: FAMILY MEDICINE CLINIC | Facility: CLINIC | Age: 37
End: 2022-09-30

## 2022-09-30 NOTE — TELEPHONE ENCOUNTER
Caller: Paige Crandall    Relationship: Self    Best call back number: [phone]      Any additional details: PATIENT WAS LETTING  Shira Corley APRN KNOW THAT SHE HAS A RHEUMATOLOGY APPT SCHEDULED  ON 10/12 - WITH [facility] ..

## 2022-10-11 ENCOUNTER — TELEPHONE (OUTPATIENT)
Dept: FAMILY MEDICINE CLINIC | Facility: CLINIC | Age: 37
End: 2022-10-11

## 2022-10-18 ENCOUNTER — LAB (OUTPATIENT)
Dept: LAB | Facility: HOSPITAL | Age: 37
End: 2022-10-18

## 2022-10-18 DIAGNOSIS — R73.09 ELEVATED HEMOGLOBIN A1C: ICD-10-CM

## 2022-10-18 DIAGNOSIS — Z13.29 SCREENING FOR THYROID DISORDER: ICD-10-CM

## 2022-10-18 LAB
ALBUMIN SERPL-MCNC: 4.9 G/DL (ref 3.5–5.2)
ALBUMIN UR-MCNC: 3.1 MG/DL
ALBUMIN/GLOB SERPL: 2.2 G/DL
ALP SERPL-CCNC: 71 U/L (ref 39–117)
ALT SERPL W P-5'-P-CCNC: 25 U/L (ref 1–33)
ANION GAP SERPL CALCULATED.3IONS-SCNC: 9.5 MMOL/L (ref 5–15)
AST SERPL-CCNC: 24 U/L (ref 1–32)
BASOPHILS # BLD AUTO: 0.06 10*3/MM3 (ref 0–0.2)
BASOPHILS NFR BLD AUTO: 0.8 % (ref 0–1.5)
BILIRUB SERPL-MCNC: 0.5 MG/DL (ref 0–1.2)
BUN SERPL-MCNC: 10 MG/DL (ref 6–20)
BUN/CREAT SERPL: 11.1 (ref 7–25)
CALCIUM SPEC-SCNC: 9.6 MG/DL (ref 8.6–10.5)
CHLORIDE SERPL-SCNC: 105 MMOL/L (ref 98–107)
CHOLEST SERPL-MCNC: 153 MG/DL (ref 0–200)
CO2 SERPL-SCNC: 26.5 MMOL/L (ref 22–29)
CREAT SERPL-MCNC: 0.9 MG/DL (ref 0.57–1)
CREAT UR-MCNC: 218.2 MG/DL
DEPRECATED RDW RBC AUTO: 41.8 FL (ref 37–54)
EGFRCR SERPLBLD CKD-EPI 2021: 84.1 ML/MIN/1.73
EOSINOPHIL # BLD AUTO: 0.11 10*3/MM3 (ref 0–0.4)
EOSINOPHIL NFR BLD AUTO: 1.5 % (ref 0.3–6.2)
ERYTHROCYTE [DISTWIDTH] IN BLOOD BY AUTOMATED COUNT: 13.3 % (ref 12.3–15.4)
GLOBULIN UR ELPH-MCNC: 2.2 GM/DL
GLUCOSE SERPL-MCNC: 84 MG/DL (ref 65–99)
HBA1C MFR BLD: 5.3 % (ref 4.8–5.6)
HCT VFR BLD AUTO: 42.7 % (ref 34–46.6)
HDLC SERPL-MCNC: 59 MG/DL (ref 40–60)
HGB BLD-MCNC: 14.9 G/DL (ref 12–15.9)
IMM GRANULOCYTES # BLD AUTO: 0.02 10*3/MM3 (ref 0–0.05)
IMM GRANULOCYTES NFR BLD AUTO: 0.3 % (ref 0–0.5)
LDLC SERPL CALC-MCNC: 83 MG/DL (ref 0–100)
LDLC/HDLC SERPL: 1.41 {RATIO}
LYMPHOCYTES # BLD AUTO: 2 10*3/MM3 (ref 0.7–3.1)
LYMPHOCYTES NFR BLD AUTO: 26.6 % (ref 19.6–45.3)
MCH RBC QN AUTO: 30.6 PG (ref 26.6–33)
MCHC RBC AUTO-ENTMCNC: 34.9 G/DL (ref 31.5–35.7)
MCV RBC AUTO: 87.7 FL (ref 79–97)
MICROALBUMIN/CREAT UR: 14.2 MG/G
MONOCYTES # BLD AUTO: 0.47 10*3/MM3 (ref 0.1–0.9)
MONOCYTES NFR BLD AUTO: 6.3 % (ref 5–12)
NEUTROPHILS NFR BLD AUTO: 4.86 10*3/MM3 (ref 1.7–7)
NEUTROPHILS NFR BLD AUTO: 64.5 % (ref 42.7–76)
NRBC BLD AUTO-RTO: 0 /100 WBC (ref 0–0.2)
PLATELET # BLD AUTO: 296 10*3/MM3 (ref 140–450)
PMV BLD AUTO: 10.6 FL (ref 6–12)
POTASSIUM SERPL-SCNC: 4.7 MMOL/L (ref 3.5–5.2)
PROT SERPL-MCNC: 7.1 G/DL (ref 6–8.5)
RBC # BLD AUTO: 4.87 10*6/MM3 (ref 3.77–5.28)
SODIUM SERPL-SCNC: 141 MMOL/L (ref 136–145)
TRIGL SERPL-MCNC: 55 MG/DL (ref 0–150)
TSH SERPL DL<=0.05 MIU/L-ACNC: 1.76 UIU/ML (ref 0.27–4.2)
VLDLC SERPL-MCNC: 11 MG/DL (ref 5–40)
WBC NRBC COR # BLD: 7.52 10*3/MM3 (ref 3.4–10.8)

## 2022-10-18 PROCEDURE — 80061 LIPID PANEL: CPT

## 2022-10-18 PROCEDURE — 82570 ASSAY OF URINE CREATININE: CPT

## 2022-10-18 PROCEDURE — 83036 HEMOGLOBIN GLYCOSYLATED A1C: CPT

## 2022-10-18 PROCEDURE — 82043 UR ALBUMIN QUANTITATIVE: CPT

## 2022-10-18 PROCEDURE — 36415 COLL VENOUS BLD VENIPUNCTURE: CPT

## 2022-10-18 PROCEDURE — 80050 GENERAL HEALTH PANEL: CPT

## 2022-12-30 ENCOUNTER — HOSPITAL ENCOUNTER (OUTPATIENT)
Dept: GENERAL RADIOLOGY | Facility: HOSPITAL | Age: 37
Discharge: HOME | End: 2022-12-30
Admitting: INTERNAL MEDICINE

## 2022-12-30 ENCOUNTER — TRANSCRIBE ORDERS (OUTPATIENT)
Dept: GENERAL RADIOLOGY | Facility: HOSPITAL | Age: 37
End: 2022-12-30
Payer: COMMERCIAL

## 2022-12-30 DIAGNOSIS — M05.79 RHEU ARTHRITIS W RHEU FACTOR MULT SITE W/O ORG/SYS INVOLV: Primary | ICD-10-CM

## 2022-12-30 DIAGNOSIS — M05.79 RHEU ARTHRITIS W RHEU FACTOR MULT SITE W/O ORG/SYS INVOLV: ICD-10-CM

## 2022-12-30 PROCEDURE — 73130 X-RAY EXAM OF HAND: CPT

## 2023-01-03 ENCOUNTER — TRANSCRIBE ORDERS (OUTPATIENT)
Dept: LAB | Facility: HOSPITAL | Age: 38
End: 2023-01-03
Payer: COMMERCIAL

## 2023-01-03 ENCOUNTER — LAB (OUTPATIENT)
Dept: LAB | Facility: HOSPITAL | Age: 38
End: 2023-01-03
Payer: COMMERCIAL

## 2023-01-03 DIAGNOSIS — M05.79 SEROPOSITIVE RHEUMATOID ARTHRITIS OF MULTIPLE SITES: ICD-10-CM

## 2023-01-03 DIAGNOSIS — M05.79 SEROPOSITIVE RHEUMATOID ARTHRITIS OF MULTIPLE SITES: Primary | ICD-10-CM

## 2023-01-03 LAB
ALBUMIN SERPL-MCNC: 4.4 G/DL (ref 3.5–5.2)
ALBUMIN/GLOB SERPL: 1.6 G/DL
ALP SERPL-CCNC: 68 U/L (ref 39–117)
ALT SERPL W P-5'-P-CCNC: 10 U/L (ref 1–33)
ANION GAP SERPL CALCULATED.3IONS-SCNC: 11 MMOL/L (ref 5–15)
AST SERPL-CCNC: 13 U/L (ref 1–32)
BASOPHILS # BLD AUTO: 0.06 10*3/MM3 (ref 0–0.2)
BASOPHILS NFR BLD AUTO: 0.4 % (ref 0–1.5)
BILIRUB SERPL-MCNC: 0.6 MG/DL (ref 0–1.2)
BUN SERPL-MCNC: 14 MG/DL (ref 6–20)
BUN/CREAT SERPL: 15.2 (ref 7–25)
CALCIUM SPEC-SCNC: 9.3 MG/DL (ref 8.6–10.5)
CHLORIDE SERPL-SCNC: 103 MMOL/L (ref 98–107)
CO2 SERPL-SCNC: 24 MMOL/L (ref 22–29)
CREAT SERPL-MCNC: 0.92 MG/DL (ref 0.57–1)
CRP SERPL-MCNC: 0.52 MG/DL (ref 0–0.5)
DEPRECATED RDW RBC AUTO: 41 FL (ref 37–54)
EGFRCR SERPLBLD CKD-EPI 2021: 81.9 ML/MIN/1.73
EOSINOPHIL # BLD AUTO: 0.05 10*3/MM3 (ref 0–0.4)
EOSINOPHIL NFR BLD AUTO: 0.3 % (ref 0.3–6.2)
ERYTHROCYTE [DISTWIDTH] IN BLOOD BY AUTOMATED COUNT: 12.8 % (ref 12.3–15.4)
ERYTHROCYTE [SEDIMENTATION RATE] IN BLOOD: 11 MM/HR (ref 0–20)
GLOBULIN UR ELPH-MCNC: 2.8 GM/DL
GLUCOSE SERPL-MCNC: 92 MG/DL (ref 65–99)
HCT VFR BLD AUTO: 45.7 % (ref 34–46.6)
HGB BLD-MCNC: 15.5 G/DL (ref 12–15.9)
IMM GRANULOCYTES # BLD AUTO: 0.08 10*3/MM3 (ref 0–0.05)
IMM GRANULOCYTES NFR BLD AUTO: 0.5 % (ref 0–0.5)
LYMPHOCYTES # BLD AUTO: 3.12 10*3/MM3 (ref 0.7–3.1)
LYMPHOCYTES NFR BLD AUTO: 19.7 % (ref 19.6–45.3)
MCH RBC QN AUTO: 29.8 PG (ref 26.6–33)
MCHC RBC AUTO-ENTMCNC: 33.9 G/DL (ref 31.5–35.7)
MCV RBC AUTO: 87.7 FL (ref 79–97)
MONOCYTES # BLD AUTO: 0.78 10*3/MM3 (ref 0.1–0.9)
MONOCYTES NFR BLD AUTO: 4.9 % (ref 5–12)
NEUTROPHILS NFR BLD AUTO: 11.74 10*3/MM3 (ref 1.7–7)
NEUTROPHILS NFR BLD AUTO: 74.2 % (ref 42.7–76)
NRBC BLD AUTO-RTO: 0 /100 WBC (ref 0–0.2)
PLATELET # BLD AUTO: 315 10*3/MM3 (ref 140–450)
PMV BLD AUTO: 10.1 FL (ref 6–12)
POTASSIUM SERPL-SCNC: 4.5 MMOL/L (ref 3.5–5.2)
PROT SERPL-MCNC: 7.2 G/DL (ref 6–8.5)
RBC # BLD AUTO: 5.21 10*6/MM3 (ref 3.77–5.28)
SODIUM SERPL-SCNC: 138 MMOL/L (ref 136–145)
WBC NRBC COR # BLD: 15.83 10*3/MM3 (ref 3.4–10.8)

## 2023-01-03 PROCEDURE — 80053 COMPREHEN METABOLIC PANEL: CPT

## 2023-01-03 PROCEDURE — 36415 COLL VENOUS BLD VENIPUNCTURE: CPT

## 2023-01-03 PROCEDURE — 85652 RBC SED RATE AUTOMATED: CPT

## 2023-01-03 PROCEDURE — 85025 COMPLETE CBC W/AUTO DIFF WBC: CPT

## 2023-01-03 PROCEDURE — 86140 C-REACTIVE PROTEIN: CPT

## 2023-04-13 ENCOUNTER — TRANSCRIBE ORDERS (OUTPATIENT)
Dept: LAB | Facility: HOSPITAL | Age: 38
End: 2023-04-13
Payer: COMMERCIAL

## 2023-04-13 ENCOUNTER — LAB (OUTPATIENT)
Dept: LAB | Facility: HOSPITAL | Age: 38
End: 2023-04-13
Payer: COMMERCIAL

## 2023-04-13 DIAGNOSIS — M05.79 SEROPOSITIVE RHEUMATOID ARTHRITIS OF MULTIPLE SITES: ICD-10-CM

## 2023-04-13 DIAGNOSIS — M05.79 SEROPOSITIVE RHEUMATOID ARTHRITIS OF MULTIPLE SITES: Primary | ICD-10-CM

## 2023-04-13 LAB
ALBUMIN SERPL-MCNC: 4.4 G/DL (ref 3.5–5.2)
ALBUMIN/GLOB SERPL: 1.9 G/DL
ALP SERPL-CCNC: 68 U/L (ref 39–117)
ALT SERPL W P-5'-P-CCNC: 16 U/L (ref 1–33)
ANION GAP SERPL CALCULATED.3IONS-SCNC: 11.5 MMOL/L (ref 5–15)
AST SERPL-CCNC: 17 U/L (ref 1–32)
BASOPHILS # BLD AUTO: 0.09 10*3/MM3 (ref 0–0.2)
BASOPHILS NFR BLD AUTO: 0.7 % (ref 0–1.5)
BILIRUB SERPL-MCNC: 0.3 MG/DL (ref 0–1.2)
BUN SERPL-MCNC: 10 MG/DL (ref 6–20)
BUN/CREAT SERPL: 10.4 (ref 7–25)
CALCIUM SPEC-SCNC: 9.1 MG/DL (ref 8.6–10.5)
CHLORIDE SERPL-SCNC: 106 MMOL/L (ref 98–107)
CO2 SERPL-SCNC: 22.5 MMOL/L (ref 22–29)
CREAT SERPL-MCNC: 0.96 MG/DL (ref 0.57–1)
CRP SERPL-MCNC: 0.37 MG/DL (ref 0–0.5)
DEPRECATED RDW RBC AUTO: 41.4 FL (ref 37–54)
EGFRCR SERPLBLD CKD-EPI 2021: 77.3 ML/MIN/1.73
EOSINOPHIL # BLD AUTO: 0.21 10*3/MM3 (ref 0–0.4)
EOSINOPHIL NFR BLD AUTO: 1.7 % (ref 0.3–6.2)
ERYTHROCYTE [DISTWIDTH] IN BLOOD BY AUTOMATED COUNT: 13 % (ref 12.3–15.4)
ERYTHROCYTE [SEDIMENTATION RATE] IN BLOOD: 3 MM/HR (ref 0–20)
GLOBULIN UR ELPH-MCNC: 2.3 GM/DL
GLUCOSE SERPL-MCNC: 79 MG/DL (ref 65–99)
HCT VFR BLD AUTO: 42.4 % (ref 34–46.6)
HGB BLD-MCNC: 14.3 G/DL (ref 12–15.9)
IMM GRANULOCYTES # BLD AUTO: 0.04 10*3/MM3 (ref 0–0.05)
IMM GRANULOCYTES NFR BLD AUTO: 0.3 % (ref 0–0.5)
LYMPHOCYTES # BLD AUTO: 3.88 10*3/MM3 (ref 0.7–3.1)
LYMPHOCYTES NFR BLD AUTO: 30.8 % (ref 19.6–45.3)
MCH RBC QN AUTO: 29.7 PG (ref 26.6–33)
MCHC RBC AUTO-ENTMCNC: 33.7 G/DL (ref 31.5–35.7)
MCV RBC AUTO: 88.1 FL (ref 79–97)
MONOCYTES # BLD AUTO: 0.67 10*3/MM3 (ref 0.1–0.9)
MONOCYTES NFR BLD AUTO: 5.3 % (ref 5–12)
NEUTROPHILS NFR BLD AUTO: 61.2 % (ref 42.7–76)
NEUTROPHILS NFR BLD AUTO: 7.69 10*3/MM3 (ref 1.7–7)
NRBC BLD AUTO-RTO: 0 /100 WBC (ref 0–0.2)
PLATELET # BLD AUTO: 327 10*3/MM3 (ref 140–450)
PMV BLD AUTO: 10.7 FL (ref 6–12)
POTASSIUM SERPL-SCNC: 3.8 MMOL/L (ref 3.5–5.2)
PROT SERPL-MCNC: 6.7 G/DL (ref 6–8.5)
RBC # BLD AUTO: 4.81 10*6/MM3 (ref 3.77–5.28)
SODIUM SERPL-SCNC: 140 MMOL/L (ref 136–145)
WBC NRBC COR # BLD: 12.58 10*3/MM3 (ref 3.4–10.8)

## 2023-04-13 PROCEDURE — 80053 COMPREHEN METABOLIC PANEL: CPT

## 2023-04-13 PROCEDURE — 86140 C-REACTIVE PROTEIN: CPT

## 2023-04-13 PROCEDURE — 85652 RBC SED RATE AUTOMATED: CPT

## 2023-04-13 PROCEDURE — 85025 COMPLETE CBC W/AUTO DIFF WBC: CPT

## 2023-04-13 PROCEDURE — 36415 COLL VENOUS BLD VENIPUNCTURE: CPT

## 2023-10-04 ENCOUNTER — LAB (OUTPATIENT)
Dept: LAB | Facility: HOSPITAL | Age: 38
End: 2023-10-04
Payer: COMMERCIAL

## 2023-10-04 ENCOUNTER — OFFICE VISIT (OUTPATIENT)
Dept: FAMILY MEDICINE CLINIC | Facility: CLINIC | Age: 38
End: 2023-10-04
Payer: COMMERCIAL

## 2023-10-04 VITALS
WEIGHT: 254 LBS | BODY MASS INDEX: 37.62 KG/M2 | HEIGHT: 69 IN | SYSTOLIC BLOOD PRESSURE: 134 MMHG | HEART RATE: 92 BPM | OXYGEN SATURATION: 97 % | DIASTOLIC BLOOD PRESSURE: 94 MMHG

## 2023-10-04 DIAGNOSIS — R10.84 GENERALIZED ABDOMINAL PAIN: ICD-10-CM

## 2023-10-04 DIAGNOSIS — Z00.00 ANNUAL PHYSICAL EXAM: ICD-10-CM

## 2023-10-04 DIAGNOSIS — R73.09 ELEVATED HEMOGLOBIN A1C: ICD-10-CM

## 2023-10-04 DIAGNOSIS — Z00.00 ANNUAL PHYSICAL EXAM: Primary | ICD-10-CM

## 2023-10-04 LAB
ALBUMIN SERPL-MCNC: 4.3 G/DL (ref 3.5–5.2)
ALBUMIN/GLOB SERPL: 1.5 G/DL
ALP SERPL-CCNC: 84 U/L (ref 39–117)
ALT SERPL W P-5'-P-CCNC: 22 U/L (ref 1–33)
AMYLASE SERPL-CCNC: 85 U/L (ref 28–100)
ANION GAP SERPL CALCULATED.3IONS-SCNC: 11 MMOL/L (ref 5–15)
AST SERPL-CCNC: 21 U/L (ref 1–32)
BASOPHILS # BLD AUTO: 0.08 10*3/MM3 (ref 0–0.2)
BASOPHILS NFR BLD AUTO: 0.8 % (ref 0–1.5)
BILIRUB SERPL-MCNC: 0.5 MG/DL (ref 0–1.2)
BUN SERPL-MCNC: 9 MG/DL (ref 6–20)
BUN/CREAT SERPL: 9.8 (ref 7–25)
CALCIUM SPEC-SCNC: 9.5 MG/DL (ref 8.6–10.5)
CHLORIDE SERPL-SCNC: 106 MMOL/L (ref 98–107)
CHOLEST SERPL-MCNC: 149 MG/DL (ref 0–200)
CO2 SERPL-SCNC: 23 MMOL/L (ref 22–29)
CREAT SERPL-MCNC: 0.92 MG/DL (ref 0.57–1)
DEPRECATED RDW RBC AUTO: 40.8 FL (ref 37–54)
EGFRCR SERPLBLD CKD-EPI 2021: 81.4 ML/MIN/1.73
EOSINOPHIL # BLD AUTO: 0.16 10*3/MM3 (ref 0–0.4)
EOSINOPHIL NFR BLD AUTO: 1.5 % (ref 0.3–6.2)
ERYTHROCYTE [DISTWIDTH] IN BLOOD BY AUTOMATED COUNT: 12.8 % (ref 12.3–15.4)
GLOBULIN UR ELPH-MCNC: 2.9 GM/DL
GLUCOSE SERPL-MCNC: 82 MG/DL (ref 65–99)
HBA1C MFR BLD: 5.3 % (ref 4.8–5.6)
HCT VFR BLD AUTO: 43.2 % (ref 34–46.6)
HDLC SERPL-MCNC: 53 MG/DL (ref 40–60)
HGB BLD-MCNC: 15 G/DL (ref 12–15.9)
IMM GRANULOCYTES # BLD AUTO: 0.03 10*3/MM3 (ref 0–0.05)
IMM GRANULOCYTES NFR BLD AUTO: 0.3 % (ref 0–0.5)
LDLC SERPL CALC-MCNC: 80 MG/DL (ref 0–100)
LDLC/HDLC SERPL: 1.49 {RATIO}
LIPASE SERPL-CCNC: 38 U/L (ref 13–60)
LYMPHOCYTES # BLD AUTO: 2.2 10*3/MM3 (ref 0.7–3.1)
LYMPHOCYTES NFR BLD AUTO: 21.1 % (ref 19.6–45.3)
MCH RBC QN AUTO: 30.5 PG (ref 26.6–33)
MCHC RBC AUTO-ENTMCNC: 34.7 G/DL (ref 31.5–35.7)
MCV RBC AUTO: 88 FL (ref 79–97)
MONOCYTES # BLD AUTO: 0.75 10*3/MM3 (ref 0.1–0.9)
MONOCYTES NFR BLD AUTO: 7.2 % (ref 5–12)
NEUTROPHILS NFR BLD AUTO: 69.1 % (ref 42.7–76)
NEUTROPHILS NFR BLD AUTO: 7.21 10*3/MM3 (ref 1.7–7)
NRBC BLD AUTO-RTO: 0 /100 WBC (ref 0–0.2)
PLATELET # BLD AUTO: 323 10*3/MM3 (ref 140–450)
PMV BLD AUTO: 10.2 FL (ref 6–12)
POTASSIUM SERPL-SCNC: 4.3 MMOL/L (ref 3.5–5.2)
PROT SERPL-MCNC: 7.2 G/DL (ref 6–8.5)
RBC # BLD AUTO: 4.91 10*6/MM3 (ref 3.77–5.28)
SODIUM SERPL-SCNC: 140 MMOL/L (ref 136–145)
T4 FREE SERPL-MCNC: 1.35 NG/DL (ref 0.93–1.7)
TRIGL SERPL-MCNC: 85 MG/DL (ref 0–150)
VLDLC SERPL-MCNC: 16 MG/DL (ref 5–40)
WBC NRBC COR # BLD: 10.43 10*3/MM3 (ref 3.4–10.8)

## 2023-10-04 PROCEDURE — 84439 ASSAY OF FREE THYROXINE: CPT

## 2023-10-04 PROCEDURE — 83690 ASSAY OF LIPASE: CPT

## 2023-10-04 PROCEDURE — 80050 GENERAL HEALTH PANEL: CPT

## 2023-10-04 PROCEDURE — 83036 HEMOGLOBIN GLYCOSYLATED A1C: CPT

## 2023-10-04 PROCEDURE — 82150 ASSAY OF AMYLASE: CPT

## 2023-10-04 PROCEDURE — 36415 COLL VENOUS BLD VENIPUNCTURE: CPT

## 2023-10-04 PROCEDURE — 84436 ASSAY OF TOTAL THYROXINE: CPT

## 2023-10-04 PROCEDURE — 84480 ASSAY TRIIODOTHYRONINE (T3): CPT

## 2023-10-04 PROCEDURE — 83013 H PYLORI (C-13) BREATH: CPT

## 2023-10-04 PROCEDURE — 84479 ASSAY OF THYROID (T3 OR T4): CPT

## 2023-10-04 PROCEDURE — 80061 LIPID PANEL: CPT

## 2023-10-04 NOTE — PROGRESS NOTES
Chief Complaint  Annual physical exam with lab    SUBJECTIVE  Paige Crandall presents to [facility]     Annual physical exam with lab  Abdominal pain    History of Present Illness  Pt is here asking for referral to GI.    Pt states she's been having stomach issues for a while. She states the past 1-4 mo its been worsening. Pt describes pain in her gallbladder then it would radiate to her sides. Pt states she has knot like feeling in her lower abdomen area. Pt states she's been taking supplements to help and it hasn't made any difference.    Pt states her bowel movements are regular now but at the time it was more irregular w/ the pain and knot in the stomach.     Pt is due for vaccines and aware at this time. Knows the risks of not having.     Pt is due for physical, paps, diabetic foot and eye exam. Pt aware.   Abdominal Pain  The current episode started more than 1 month ago. The problem occurs intermittently. The problem has been unchanged. The pain is located in the generalized abdominal region, RUQ and epigastric region. The pain is severe. The quality of the pain is a sensation of fullness, aching and cramping. The abdominal pain radiates to the back. Associated symptoms include belching, nausea and vomiting. Nothing aggravates the pain. She has tried H2 blockers for the symptoms.     Pt has had about 4 random episodes or stomach attacks. This started in February.      Past Medical History:   Diagnosis Date    Allergic     Arthritis     Asthma     Diabetes mellitus     GERD (gastroesophageal reflux disease)     HL (hearing loss)     Hypertension     Obesity     Pneumonia       Family History   Problem Relation Age of Onset    Diabetes Mother     Arthritis Mother     Other Mother         Degenerative Disc Disease    Diabetes Father     Hearing loss Father     Hyperlipidemia Father     Alcohol abuse Maternal Grandfather     Cancer Maternal Grandfather         Lung    Diabetes  Maternal Grandfather     Early death Maternal Grandfather         Lung Cancer from smoking    Stroke Maternal Grandmother     Hearing loss Paternal Grandfather     Heart disease Paternal Grandfather     Hyperlipidemia Paternal Grandfather     Cancer Paternal Grandmother         Breast    Hyperlipidemia Paternal Grandmother     Vision loss Paternal Grandmother     Alcohol abuse Maternal Uncle     Mental illness Maternal Uncle     Alcohol abuse Maternal Aunt     Asthma Maternal Aunt     Depression Maternal Aunt     Drug abuse Maternal Aunt     Liver disease Maternal Aunt     Mental illness Maternal Aunt     Thyroid disease Maternal Aunt     Other Maternal Aunt         antiphospholipid antibody syndrome    Developmental Disability Paternal Aunt         Born with cerebral palsy    Miscarriages / Stillbirths Paternal Aunt     Miscarriages / Stillbirths Sister     Other Maternal Uncle         Degenerative Disc Disease      History reviewed. No pertinent surgical history.     Current Outpatient Medications:     diclofenac (CATAFLAM) 50 MG tablet, take 1 tablet by oral route 2 times every day, Disp: 180 tablet, Rfl: 1    folic acid (FOLVITE) 1 MG tablet, TAKE 2 TABLETS BY MOUTH DAILY, Disp: 180 tablet, Rfl: 2    hydroxychloroquine (Plaquenil) 200 MG tablet, take 2 tablets by oral route  every day, Disp: 180 tablet, Rfl: 1    MAGNESIUM CHLORIDE PO, Take 2 tablets by mouth 2 (Two) Times a Day., Disp: , Rfl:     melatonin 5 MG tablet tablet, Take 1 tablet by mouth., Disp: , Rfl:     methotrexate 2.5 MG tablet, Take 5 tablets (12.5 mg) by mouth weekly, Disp: 20 tablet, Rfl: 2    methotrexate 2.5 MG tablet, Take 5 tablets by mouth every week, Disp: 60 tablet, Rfl: 0    multivitamin with minerals tablet tablet, Take 1 tablet by mouth Daily., Disp: , Rfl:     azithromycin (Zithromax Z-Marcel) 250 MG tablet, Take 2 tablets by mouth on day 1, then 1 tablet daily on days 2-5 (Patient not taking: Reported on 10/4/2023), Disp: 6 tablet,  "Rfl: 0    diclofenac (VOLTAREN) 50 MG EC tablet, Take 1 tablet by mouth 3 (Three) Times a Day. (Patient not taking: Reported on 10/4/2023), Disp: 90 tablet, Rfl: 0    fluticasone (FLONASE) 50 MCG/ACT nasal spray, 2 sprays into the nostril(s) as directed by provider Daily. 2 puffs each nostril (Patient not taking: Reported on 10/4/2023), Disp: 16 g, Rfl: 0    folic acid (FOLVITE) 1 MG tablet, Take 2 tablets by mouth Daily., Disp: 180 tablet, Rfl: 3    methotrexate 2.5 MG tablet, Take 5 tablets by mouth 1 (One) Time Per Week. (Patient not taking: Reported on 10/4/2023), Disp: 60 tablet, Rfl: 0    OBJECTIVE  Vital Signs:   /94   Pulse 92   Ht 175.3 cm (69\")   Wt 115 kg (254 lb)   LMP 09/28/2023   SpO2 97%   BMI 37.51 kg/m²    Estimated body mass index is 37.51 kg/m² as calculated from the following:    Height as of this encounter: 175.3 cm (69\").    Weight as of this encounter: 115 kg (254 lb).     Wt Readings from Last 3 Encounters:   10/04/23 115 kg (254 lb)   12/27/22 117 kg (259 lb)   09/28/22 120 kg (264 lb 6.4 oz)     BP Readings from Last 3 Encounters:   10/04/23 134/94   12/27/22 150/89   09/28/22 113/71       Physical Exam  Vitals reviewed.   Constitutional:       Appearance: Normal appearance. She is well-developed.   HENT:      Head: Normocephalic and atraumatic.      Right Ear: External ear normal.      Left Ear: External ear normal.      Mouth/Throat:      Pharynx: No oropharyngeal exudate.   Eyes:      Conjunctiva/sclera: Conjunctivae normal.      Pupils: Pupils are equal, round, and reactive to light.   Cardiovascular:      Rate and Rhythm: Normal rate and regular rhythm.      Pulses: Normal pulses.      Heart sounds: Normal heart sounds. No murmur heard.    No friction rub. No gallop.   Pulmonary:      Effort: Pulmonary effort is normal.      Breath sounds: Normal breath sounds. No wheezing or rhonchi.   Abdominal:      General: Bowel sounds are normal.      Palpations: Abdomen is soft.      " Tenderness:  in the right upper quadrant and epigastric area   Skin:     General: Skin is warm and dry.   Neurological:      Mental Status: She is alert and oriented to person, place, and time.      Cranial Nerves: No cranial nerve deficit.   Psychiatric:         Mood and Affect: Mood and affect normal.         Behavior: Behavior normal.         Thought Content: Thought content normal.         Judgment: Judgment normal.        Result Review        No Images in the past 120 days found..     The above data has been reviewed by MICHAEL Hope 10/04/2023 12:44 EDT.          Patient Care Team:  Shira Corley APRN as PCP - General (Family Medicine)  Pat Beasley MD as Consulting Physician (Rheumatology)    Class 2 Severe Obesity (BMI >=35 and <=39.9). Obesity-related health conditions include the following: none. Obesity is unchanged. BMI is is above average; BMI management plan is completed. We discussed low calorie, low carb based diet program, portion control, and increasing exercise.       ASSESSMENT & PLAN    Diagnoses and all orders for this visit:    1. Annual physical exam (Primary)  -     Comprehensive Metabolic Panel; Future  -     CBC & Differential; Future  -     Lipid Panel; Future  -     Amylase; Future  -     T4, free; Future  -     Thyroid Profile II; Future    2. Generalized abdominal pain  -     Lipase; Future  -     H. Pylori Breath Test - Breath, Lung; Future  -     US Abdomen Complete; Future    3. Elevated hemoglobin A1c  -     Hemoglobin A1c; Future         Tobacco Use: Low Risk     Smoking Tobacco Use: Never    Smokeless Tobacco Use: Never    Passive Exposure: Never       Follow Up     Return if symptoms worsen or fail to improve.        Patient was given instructions and counseling regarding her condition or for health maintenance advice. Please see specific information pulled into the AVS if appropriate.   I have reviewed information obtained and documented by others and I have  confirmed the accuracy of this documented note.    MICHAEL Hope      Patient has been erroneously marked as diabetic. Based on the available clinical information, she does not have diabetes and should therefore be excluded from diabetic health maintenance and quality measures for the remainder of the reporting period.

## 2023-10-05 LAB — UREA BREATH TEST QL: NEGATIVE

## 2023-10-06 LAB
FT4I SERPL CALC-MCNC: 2.2 (ref 1.2–4.9)
T3 SERPL-MCNC: 125 NG/DL (ref 71–180)
T3RU NFR SERPL: 28 % (ref 24–39)
T4 SERPL-MCNC: 8 UG/DL (ref 4.5–12)
TSH SERPL DL<=0.005 MIU/L-ACNC: 1.65 UIU/ML (ref 0.45–4.5)

## 2023-10-12 ENCOUNTER — TELEPHONE (OUTPATIENT)
Dept: FAMILY MEDICINE CLINIC | Facility: CLINIC | Age: 38
End: 2023-10-12
Payer: COMMERCIAL

## 2023-10-12 NOTE — TELEPHONE ENCOUNTER
PER ANUJ BARILLAS Call patient-negative H. pylori breath test, all other labs normal.  Proceed with abdominal ultrasound, referral was placed at office visit.     OK FOR HUB TO RELAY

## 2023-10-25 ENCOUNTER — HOSPITAL ENCOUNTER (OUTPATIENT)
Dept: ULTRASOUND IMAGING | Facility: HOSPITAL | Age: 38
Discharge: HOME | End: 2023-10-25
Admitting: NURSE PRACTITIONER
Payer: COMMERCIAL

## 2023-10-25 DIAGNOSIS — R10.11 RIGHT UPPER QUADRANT ABDOMINAL PAIN: ICD-10-CM

## 2023-10-25 DIAGNOSIS — R10.84 GENERALIZED ABDOMINAL PAIN: ICD-10-CM

## 2023-10-25 DIAGNOSIS — K80.80 OTHER CHOLELITHIASIS WITHOUT OBSTRUCTION: Primary | ICD-10-CM

## 2023-10-25 PROCEDURE — 76700 US EXAM ABDOM COMPLETE: CPT

## 2023-11-06 ENCOUNTER — LAB (OUTPATIENT)
Dept: LAB | Facility: HOSPITAL | Age: 38
End: 2023-11-06
Payer: COMMERCIAL

## 2023-11-06 ENCOUNTER — TRANSCRIBE ORDERS (OUTPATIENT)
Dept: LAB | Facility: HOSPITAL | Age: 38
End: 2023-11-06
Payer: COMMERCIAL

## 2023-11-06 DIAGNOSIS — M06.09 RHEUMATOID ARTHRITIS OF MULTIPLE SITES WITHOUT RHEUMATOID FACTOR: Primary | ICD-10-CM

## 2023-11-06 DIAGNOSIS — M06.09 RHEUMATOID ARTHRITIS OF MULTIPLE SITES WITHOUT RHEUMATOID FACTOR: ICD-10-CM

## 2023-11-06 LAB
ALBUMIN SERPL-MCNC: 4.6 G/DL (ref 3.5–5.2)
ALBUMIN/GLOB SERPL: 2.3 G/DL
ALP SERPL-CCNC: 74 U/L (ref 39–117)
ALT SERPL W P-5'-P-CCNC: 26 U/L (ref 1–33)
ANION GAP SERPL CALCULATED.3IONS-SCNC: 9 MMOL/L (ref 5–15)
AST SERPL-CCNC: 21 U/L (ref 1–32)
BASOPHILS # BLD AUTO: 0.07 10*3/MM3 (ref 0–0.2)
BASOPHILS NFR BLD AUTO: 0.6 % (ref 0–1.5)
BILIRUB SERPL-MCNC: 0.5 MG/DL (ref 0–1.2)
BUN SERPL-MCNC: 10 MG/DL (ref 6–20)
BUN/CREAT SERPL: 10.2 (ref 7–25)
CALCIUM SPEC-SCNC: 9.5 MG/DL (ref 8.6–10.5)
CHLORIDE SERPL-SCNC: 105 MMOL/L (ref 98–107)
CO2 SERPL-SCNC: 27 MMOL/L (ref 22–29)
CREAT SERPL-MCNC: 0.98 MG/DL (ref 0.57–1)
CRP SERPL-MCNC: 0.35 MG/DL (ref 0–0.5)
DEPRECATED RDW RBC AUTO: 39.9 FL (ref 37–54)
EGFRCR SERPLBLD CKD-EPI 2021: 75.5 ML/MIN/1.73
EOSINOPHIL # BLD AUTO: 0.12 10*3/MM3 (ref 0–0.4)
EOSINOPHIL NFR BLD AUTO: 1 % (ref 0.3–6.2)
ERYTHROCYTE [DISTWIDTH] IN BLOOD BY AUTOMATED COUNT: 13 % (ref 12.3–15.4)
ERYTHROCYTE [SEDIMENTATION RATE] IN BLOOD: 2 MM/HR (ref 0–20)
GLOBULIN UR ELPH-MCNC: 2 GM/DL
GLUCOSE SERPL-MCNC: 134 MG/DL (ref 65–99)
HCT VFR BLD AUTO: 39 % (ref 34–46.6)
HGB BLD-MCNC: 13.5 G/DL (ref 12–15.9)
IMM GRANULOCYTES # BLD AUTO: 0.05 10*3/MM3 (ref 0–0.05)
IMM GRANULOCYTES NFR BLD AUTO: 0.4 % (ref 0–0.5)
LYMPHOCYTES # BLD AUTO: 2.3 10*3/MM3 (ref 0.7–3.1)
LYMPHOCYTES NFR BLD AUTO: 18.7 % (ref 19.6–45.3)
MCH RBC QN AUTO: 29.8 PG (ref 26.6–33)
MCHC RBC AUTO-ENTMCNC: 34.6 G/DL (ref 31.5–35.7)
MCV RBC AUTO: 86.1 FL (ref 79–97)
MONOCYTES # BLD AUTO: 0.79 10*3/MM3 (ref 0.1–0.9)
MONOCYTES NFR BLD AUTO: 6.4 % (ref 5–12)
NEUTROPHILS NFR BLD AUTO: 72.9 % (ref 42.7–76)
NEUTROPHILS NFR BLD AUTO: 8.99 10*3/MM3 (ref 1.7–7)
NRBC BLD AUTO-RTO: 0.1 /100 WBC (ref 0–0.2)
PLATELET # BLD AUTO: 281 10*3/MM3 (ref 140–450)
PMV BLD AUTO: 10.7 FL (ref 6–12)
POTASSIUM SERPL-SCNC: 4 MMOL/L (ref 3.5–5.2)
PROT SERPL-MCNC: 6.6 G/DL (ref 6–8.5)
RBC # BLD AUTO: 4.53 10*6/MM3 (ref 3.77–5.28)
SODIUM SERPL-SCNC: 141 MMOL/L (ref 136–145)
WBC NRBC COR # BLD: 12.32 10*3/MM3 (ref 3.4–10.8)

## 2023-11-06 PROCEDURE — 85025 COMPLETE CBC W/AUTO DIFF WBC: CPT

## 2023-11-06 PROCEDURE — 85652 RBC SED RATE AUTOMATED: CPT

## 2023-11-06 PROCEDURE — 36415 COLL VENOUS BLD VENIPUNCTURE: CPT

## 2023-11-06 PROCEDURE — 80053 COMPREHEN METABOLIC PANEL: CPT

## 2023-11-06 PROCEDURE — 86140 C-REACTIVE PROTEIN: CPT

## 2023-11-13 ENCOUNTER — OFFICE VISIT (OUTPATIENT)
Dept: SURGERY | Facility: CLINIC | Age: 38
End: 2023-11-13
Payer: COMMERCIAL

## 2023-11-13 VITALS
HEART RATE: 78 BPM | BODY MASS INDEX: 38.06 KG/M2 | TEMPERATURE: 98 F | OXYGEN SATURATION: 98 % | WEIGHT: 257 LBS | DIASTOLIC BLOOD PRESSURE: 76 MMHG | HEIGHT: 69 IN | SYSTOLIC BLOOD PRESSURE: 116 MMHG

## 2023-11-13 DIAGNOSIS — Z79.899 IMMUNOSUPPRESSION DUE TO DRUG THERAPY: ICD-10-CM

## 2023-11-13 DIAGNOSIS — E66.01 MORBID (SEVERE) OBESITY DUE TO EXCESS CALORIES: ICD-10-CM

## 2023-11-13 DIAGNOSIS — K80.20 SYMPTOMATIC CHOLELITHIASIS: Primary | ICD-10-CM

## 2023-11-13 DIAGNOSIS — D84.821 IMMUNOSUPPRESSION DUE TO DRUG THERAPY: ICD-10-CM

## 2023-11-13 PROCEDURE — 99204 OFFICE O/P NEW MOD 45 MIN: CPT | Performed by: STUDENT IN AN ORGANIZED HEALTH CARE EDUCATION/TRAINING PROGRAM

## 2023-11-13 NOTE — PROGRESS NOTES
General Surgery Initial Office Visit    Referring Provider: MICHAEL Hope    Chief Complaint:    Abdominal pain    History of Present Illness:    Paige Crandall is a 38 y.o. female with cholelithiasis who presents to General Surgery clinic for evaluation for cholecystectomy.    The patient reports for recent episodes of epigastric pain radiating to her right upper quadrant.  She has had associated nausea and vomiting with this.  She denies any associated fevers, chills, chest pain, shortness of breath, change in bowel function, or bloody emesis or stool.  She states her first 2 attacks were more severe in the second or more dull, throbbing, aching pain.  They range from a few hours to 36 hours in duration.  She noticed her first 2 attacks after eating cereal, coffee, and yogurt.  She has otherwise not noticed any food associations.  She has not taken anything for her pain.  She has a history of GERD (denies prior EGD) and took a PPI intermittently for a while but states she developed restless leg syndrome associated with it so she stopped it and now takes famotidine as needed.  She describes prior hypertension and diabetes but states that after a restrictive medical diet these have resolved.  Her mother has a history of biliary sludge and is being evaluated for cholecystectomy.  The patient denies any other family members with gallbladder disease.  She has never undergone any surgeries.  She has not undergone EGD or colonoscopy.  She does have rheumatoid arthritis and takes hydroxychloroquine and methotrexate.  She took 20 mg of prednisone daily for approximately 8 months but says she stopped this in mid 2022.     The patient also reports pain in her left side over her ASIS at the site of her panniculus and questions whether she has a hernia there.  She denies any obstructive symptoms associated with it and has never felt a bulge in that area.    Past Medical History:   RA on methotrexate and hydroxychloroquine  with prior steroid use  GERD  Hypertension  Diabetes mellitus  Obesity, BMI 37  Asthma  Past Medical History:   Diagnosis Date    Allergic     Arthritis     Asthma     Diabetes mellitus     GERD (gastroesophageal reflux disease)     HL (hearing loss)     Hypertension     Obesity     Pneumonia         Past Surgical History:    Denies prior surgeries or endoscopies      Family History:    Mother has gallbladder disease  Family History   Problem Relation Age of Onset    Diabetes Mother     Arthritis Mother     Other Mother         Degenerative Disc Disease    Diabetes Father     Hearing loss Father     Hyperlipidemia Father     Alcohol abuse Maternal Grandfather     Cancer Maternal Grandfather         Lung    Diabetes Maternal Grandfather     Early death Maternal Grandfather         Lung Cancer from smoking    Stroke Maternal Grandmother     Hearing loss Paternal Grandfather     Heart disease Paternal Grandfather     Hyperlipidemia Paternal Grandfather     Cancer Paternal Grandmother         Breast    Hyperlipidemia Paternal Grandmother     Vision loss Paternal Grandmother     Alcohol abuse Maternal Uncle     Mental illness Maternal Uncle     Alcohol abuse Maternal Aunt     Asthma Maternal Aunt     Depression Maternal Aunt     Drug abuse Maternal Aunt     Liver disease Maternal Aunt     Mental illness Maternal Aunt     Thyroid disease Maternal Aunt     Other Maternal Aunt         antiphospholipid antibody syndrome    Developmental Disability Paternal Aunt         Born with cerebral palsy    Miscarriages / Stillbirths Paternal Aunt     Miscarriages / Stillbirths Sister     Other Maternal Uncle         Degenerative Disc Disease         Social History:    Denies tobacco or recreational drug use  Drinks alcohol rarely    Social History     Socioeconomic History    Marital status: Single   Tobacco Use    Smoking status: Never     Passive exposure: Never    Smokeless tobacco: Never   Vaping Use    Vaping Use: Never used  "  Substance and Sexual Activity    Alcohol use: Yes     Comment: occ    Drug use: Never     Comment: Family history of substance abuse    Sexual activity: Defer       Allergies:   Patient reports latex allergy  Allergies   Allergen Reactions    Estrogens Unknown - Low Severity     Hair loss, weight gain, \"mental issues\" per pt     Latex Dermatitis     Medications:     Current Outpatient Medications:     diclofenac (CATAFLAM) 50 MG tablet, take 1 tablet by oral route 2 times every day, Disp: 180 tablet, Rfl: 1    folic acid (FOLVITE) 1 MG tablet, TAKE 2 TABLETS BY MOUTH DAILY, Disp: 180 tablet, Rfl: 2    hydroxychloroquine (Plaquenil) 200 MG tablet, take 2 tablets by oral route  every day, Disp: 180 tablet, Rfl: 1    Liver Extract (LIVER PO), Take  by mouth., Disp: , Rfl:     MAGNESIUM CHLORIDE PO, Take 2 tablets by mouth 2 (Two) Times a Day., Disp: , Rfl:     melatonin 5 MG tablet tablet, Take 1 tablet by mouth., Disp: , Rfl:     methotrexate 2.5 MG tablet, Take 5 tablets by once every week, Disp: 60 tablet, Rfl: 0    multivitamin with minerals tablet tablet, Take 1 tablet by mouth Daily., Disp: , Rfl:     methotrexate 2.5 MG tablet, Take 5 tablets (12.5 mg) by mouth weekly (Patient not taking: Reported on 11/13/2023), Disp: 20 tablet, Rfl: 2    methotrexate 2.5 MG tablet, Take 5 tablets by mouth every week (Patient not taking: Reported on 11/13/2023), Disp: 60 tablet, Rfl: 0    Review of Systems:   Constitutional: denies fevers, chills  Eyes: denies vision changes or scleral icterus  Resp: denies cough, shortness of breath  CV: denies chest pain, heart palpitations  GI: + abdominal pain, nausea, vomiting, denies diarrhea, constipation, melena, hematochezia  : denies hematuria, dysuria    Physical Exam:   Constitutional: Well-developed, well-nourished  Respiratory: Normal inspiratory effort  Cardiovascular: Regular rate and rhythm, no peripheral edema  Gastrointestinal: Abd soft, nontender, nondistended, no " rebound or guarding, no Ace sign, no palpable masses or hernia defects, no tenderness over left ASIS or left abdomen panniculus  Skin:  Warm, dry, no rash on visualized skin surfaces  Musculoskeletal: Symmetric strength, normal gait  Psychiatric: Normal mood and affect    Radiology/Endoscopy:    Abdominal ultrasound 10/25/2023 images and report independently reviewed, demonstrates gallstones, CBD diameter 3mm, no cholecystitis    Labs:    CBC 11/6/2023-WBC 12.3, hemoglobin 13.5, platelets 281  CMP 11/6/2023-glucose 134, BUN 10, creatinine 0.98, sodium 141, potassium 4.0, chloride 105, CO2 27, calcium 9.5, albumin 4.6, ALT 26, AST 21, alkaline phosphatase 74, total bilirubin 0.5.  Lipase 10/4/2023-38  CRP 11/6/2023-0.35  ESR 11/6/2023-2  H. pylori breath test 10/4/2023-negative  Hemoglobin A1c 10/4/2023-5.3%     Assessment/Plan:  38-year-old female presenting with symptomatic cholelithiasis without cholecystitis or obstruction    I discussed with the patient the spectrum of gallbladder disease, with her falling into a category of symptomatic cholelithiasis without cholecystitis or biliary obstruction based on her workup and exam.  I explained her management options including observation with anti-inflammatories for pain and avoidance of food triggers versus surgical management with laparoscopic possible open cholecystectomy with intraoperative cholangiogram.  I discussed with her procedure details for laparoscopic possible open cholecystectomy with intraoperative cholangiogram, including the nature of the procedure, the risks (including but not limited to bleeding, infection, injury to surrounding structures, risk for conversion to open procedure, risk of injury to the biliary tree or postoperative bile leak, changes in bowel function accompanying cholecystectomy), benefits, alternatives, and postoperative expectations including recommendations for postop diet, wound care, activity restrictions, and pain control.   All questions were answered.  At this time the patient wishes to think about her options and will call us if she decides to proceed with surgery.    Class 2 Severe Obesity (BMI >=35 and <=39.9). Obesity-related health conditions include the following: hypertension and GERD. Obesity is newly identified. BMI is is above average; BMI management plan is completed. The patient has previously undergone medical weight loss plan. We discussed healthy choices within a gallbladder eating plan.      Sadie Joe M.D.  General, Robotic, and Endoscopic Surgery  [facility]    [address] [place] [address], [address]  [place], [place], [address]  P: [phone]  F: [phone]

## 2024-02-02 ENCOUNTER — TRANSCRIBE ORDERS (OUTPATIENT)
Dept: LAB | Facility: HOSPITAL | Age: 39
End: 2024-02-02
Payer: COMMERCIAL

## 2024-02-02 ENCOUNTER — LAB (OUTPATIENT)
Dept: LAB | Facility: HOSPITAL | Age: 39
End: 2024-02-02
Payer: COMMERCIAL

## 2024-02-02 DIAGNOSIS — M06.09 RHEUMATOID ARTHRITIS OF MULTIPLE SITES WITHOUT RHEUMATOID FACTOR: ICD-10-CM

## 2024-02-02 DIAGNOSIS — M06.09 RHEUMATOID ARTHRITIS OF MULTIPLE SITES WITHOUT RHEUMATOID FACTOR: Primary | ICD-10-CM

## 2024-02-02 LAB
ALBUMIN SERPL-MCNC: 4.4 G/DL (ref 3.5–5.2)
ALBUMIN/GLOB SERPL: 1.9 G/DL
ALP SERPL-CCNC: 70 U/L (ref 39–117)
ALT SERPL W P-5'-P-CCNC: 26 U/L (ref 1–33)
ANION GAP SERPL CALCULATED.3IONS-SCNC: 12 MMOL/L (ref 5–15)
AST SERPL-CCNC: 17 U/L (ref 1–32)
BASOPHILS # BLD AUTO: 0.06 10*3/MM3 (ref 0–0.2)
BASOPHILS NFR BLD AUTO: 0.6 % (ref 0–1.5)
BILIRUB SERPL-MCNC: 0.5 MG/DL (ref 0–1.2)
BUN SERPL-MCNC: 8 MG/DL (ref 6–20)
BUN/CREAT SERPL: 7.7 (ref 7–25)
CALCIUM SPEC-SCNC: 9.3 MG/DL (ref 8.6–10.5)
CHLORIDE SERPL-SCNC: 105 MMOL/L (ref 98–107)
CO2 SERPL-SCNC: 23 MMOL/L (ref 22–29)
CREAT SERPL-MCNC: 1.04 MG/DL (ref 0.57–1)
CRP SERPL-MCNC: 0.33 MG/DL (ref 0–0.5)
DEPRECATED RDW RBC AUTO: 40.6 FL (ref 37–54)
EGFRCR SERPLBLD CKD-EPI 2021: 70.3 ML/MIN/1.73
EOSINOPHIL # BLD AUTO: 0.17 10*3/MM3 (ref 0–0.4)
EOSINOPHIL NFR BLD AUTO: 1.8 % (ref 0.3–6.2)
ERYTHROCYTE [DISTWIDTH] IN BLOOD BY AUTOMATED COUNT: 13.2 % (ref 12.3–15.4)
ERYTHROCYTE [SEDIMENTATION RATE] IN BLOOD: 4 MM/HR (ref 0–20)
GLOBULIN UR ELPH-MCNC: 2.3 GM/DL
GLUCOSE SERPL-MCNC: 90 MG/DL (ref 65–99)
HCT VFR BLD AUTO: 42.1 % (ref 34–46.6)
HGB BLD-MCNC: 14.2 G/DL (ref 12–15.9)
IMM GRANULOCYTES # BLD AUTO: 0.02 10*3/MM3 (ref 0–0.05)
IMM GRANULOCYTES NFR BLD AUTO: 0.2 % (ref 0–0.5)
LYMPHOCYTES # BLD AUTO: 2.54 10*3/MM3 (ref 0.7–3.1)
LYMPHOCYTES NFR BLD AUTO: 26.6 % (ref 19.6–45.3)
MCH RBC QN AUTO: 29.2 PG (ref 26.6–33)
MCHC RBC AUTO-ENTMCNC: 33.7 G/DL (ref 31.5–35.7)
MCV RBC AUTO: 86.4 FL (ref 79–97)
MONOCYTES # BLD AUTO: 0.55 10*3/MM3 (ref 0.1–0.9)
MONOCYTES NFR BLD AUTO: 5.8 % (ref 5–12)
NEUTROPHILS NFR BLD AUTO: 6.2 10*3/MM3 (ref 1.7–7)
NEUTROPHILS NFR BLD AUTO: 65 % (ref 42.7–76)
NRBC BLD AUTO-RTO: 0 /100 WBC (ref 0–0.2)
PLATELET # BLD AUTO: 291 10*3/MM3 (ref 140–450)
PMV BLD AUTO: 10.8 FL (ref 6–12)
POTASSIUM SERPL-SCNC: 4.1 MMOL/L (ref 3.5–5.2)
PROT SERPL-MCNC: 6.7 G/DL (ref 6–8.5)
RBC # BLD AUTO: 4.87 10*6/MM3 (ref 3.77–5.28)
SODIUM SERPL-SCNC: 140 MMOL/L (ref 136–145)
WBC NRBC COR # BLD AUTO: 9.54 10*3/MM3 (ref 3.4–10.8)

## 2024-02-02 PROCEDURE — 80053 COMPREHEN METABOLIC PANEL: CPT

## 2024-02-02 PROCEDURE — 85025 COMPLETE CBC W/AUTO DIFF WBC: CPT

## 2024-02-02 PROCEDURE — 86140 C-REACTIVE PROTEIN: CPT

## 2024-02-02 PROCEDURE — 85652 RBC SED RATE AUTOMATED: CPT

## 2024-02-02 PROCEDURE — 36415 COLL VENOUS BLD VENIPUNCTURE: CPT

## 2024-03-05 DIAGNOSIS — M05.79 RHEUMATOID ARTHRITIS INVOLVING MULTIPLE SITES WITH POSITIVE RHEUMATOID FACTOR: ICD-10-CM

## 2024-03-05 RX ORDER — FOLIC ACID 1 MG/1
2 TABLET ORAL DAILY
Qty: 180 TABLET | Refills: 0 | Status: SHIPPED | OUTPATIENT
Start: 2024-03-05

## 2024-03-26 ENCOUNTER — OFFICE VISIT (OUTPATIENT)
Dept: FAMILY MEDICINE CLINIC | Facility: CLINIC | Age: 39
End: 2024-03-26
Payer: COMMERCIAL

## 2024-03-26 ENCOUNTER — TRANSCRIBE ORDERS (OUTPATIENT)
Dept: ADMINISTRATIVE | Facility: HOSPITAL | Age: 39
End: 2024-03-26
Payer: COMMERCIAL

## 2024-03-26 ENCOUNTER — LAB (OUTPATIENT)
Dept: LAB | Facility: HOSPITAL | Age: 39
End: 2024-03-26
Payer: COMMERCIAL

## 2024-03-26 VITALS
DIASTOLIC BLOOD PRESSURE: 74 MMHG | WEIGHT: 266.6 LBS | BODY MASS INDEX: 39.49 KG/M2 | TEMPERATURE: 98.3 F | SYSTOLIC BLOOD PRESSURE: 123 MMHG | HEART RATE: 83 BPM | HEIGHT: 69 IN | OXYGEN SATURATION: 97 %

## 2024-03-26 DIAGNOSIS — R42 DIZZINESS: ICD-10-CM

## 2024-03-26 DIAGNOSIS — M79.671 CHRONIC PAIN OF RIGHT HEEL: ICD-10-CM

## 2024-03-26 DIAGNOSIS — M06.09 RHEUMATOID ARTHRITIS OF MULTIPLE SITES WITHOUT RHEUMATOID FACTOR: ICD-10-CM

## 2024-03-26 DIAGNOSIS — R00.2 PALPITATIONS: ICD-10-CM

## 2024-03-26 DIAGNOSIS — R40.0 DAYTIME SOMNOLENCE: ICD-10-CM

## 2024-03-26 DIAGNOSIS — M06.09 RHEUMATOID ARTHRITIS OF MULTIPLE SITES WITHOUT RHEUMATOID FACTOR: Primary | ICD-10-CM

## 2024-03-26 DIAGNOSIS — R00.2 PALPITATIONS: Primary | ICD-10-CM

## 2024-03-26 DIAGNOSIS — G89.29 CHRONIC PAIN OF RIGHT HEEL: ICD-10-CM

## 2024-03-26 DIAGNOSIS — Z13.220 LIPID SCREENING: ICD-10-CM

## 2024-03-26 LAB
ALBUMIN SERPL-MCNC: 4.4 G/DL (ref 3.5–5.2)
ALBUMIN/GLOB SERPL: 2 G/DL
ALP SERPL-CCNC: 68 U/L (ref 39–117)
ALT SERPL W P-5'-P-CCNC: 21 U/L (ref 1–33)
ANION GAP SERPL CALCULATED.3IONS-SCNC: 9 MMOL/L (ref 5–15)
AST SERPL-CCNC: 17 U/L (ref 1–32)
BASOPHILS # BLD AUTO: 0.05 10*3/MM3 (ref 0–0.2)
BASOPHILS NFR BLD AUTO: 0.6 % (ref 0–1.5)
BILIRUB SERPL-MCNC: 0.5 MG/DL (ref 0–1.2)
BUN SERPL-MCNC: 12 MG/DL (ref 6–20)
BUN/CREAT SERPL: 12.2 (ref 7–25)
CALCIUM SPEC-SCNC: 9.3 MG/DL (ref 8.6–10.5)
CHLORIDE SERPL-SCNC: 106 MMOL/L (ref 98–107)
CHOLEST SERPL-MCNC: 155 MG/DL (ref 0–200)
CO2 SERPL-SCNC: 25 MMOL/L (ref 22–29)
CREAT SERPL-MCNC: 0.98 MG/DL (ref 0.57–1)
CRP SERPL-MCNC: 0.55 MG/DL (ref 0–0.5)
DEPRECATED RDW RBC AUTO: 40.7 FL (ref 37–54)
EGFRCR SERPLBLD CKD-EPI 2021: 75.5 ML/MIN/1.73
EOSINOPHIL # BLD AUTO: 0.11 10*3/MM3 (ref 0–0.4)
EOSINOPHIL NFR BLD AUTO: 1.3 % (ref 0.3–6.2)
ERYTHROCYTE [DISTWIDTH] IN BLOOD BY AUTOMATED COUNT: 13.3 % (ref 12.3–15.4)
ERYTHROCYTE [SEDIMENTATION RATE] IN BLOOD: 3 MM/HR (ref 0–20)
GLOBULIN UR ELPH-MCNC: 2.2 GM/DL
GLUCOSE SERPL-MCNC: 86 MG/DL (ref 65–99)
HCT VFR BLD AUTO: 41.9 % (ref 34–46.6)
HDLC SERPL QL: 2.63
HDLC SERPL-MCNC: 59 MG/DL (ref 40–60)
HGB BLD-MCNC: 14.3 G/DL (ref 12–15.9)
IMM GRANULOCYTES # BLD AUTO: 0.03 10*3/MM3 (ref 0–0.05)
IMM GRANULOCYTES NFR BLD AUTO: 0.4 % (ref 0–0.5)
LDLC SERPL CALC-MCNC: 85 MG/DL (ref 0–100)
LYMPHOCYTES # BLD AUTO: 2.2 10*3/MM3 (ref 0.7–3.1)
LYMPHOCYTES NFR BLD AUTO: 25.9 % (ref 19.6–45.3)
MCH RBC QN AUTO: 29.1 PG (ref 26.6–33)
MCHC RBC AUTO-ENTMCNC: 34.1 G/DL (ref 31.5–35.7)
MCV RBC AUTO: 85.2 FL (ref 79–97)
MONOCYTES # BLD AUTO: 0.5 10*3/MM3 (ref 0.1–0.9)
MONOCYTES NFR BLD AUTO: 5.9 % (ref 5–12)
NEUTROPHILS NFR BLD AUTO: 5.59 10*3/MM3 (ref 1.7–7)
NEUTROPHILS NFR BLD AUTO: 65.9 % (ref 42.7–76)
NRBC BLD AUTO-RTO: 0 /100 WBC (ref 0–0.2)
PLATELET # BLD AUTO: 262 10*3/MM3 (ref 140–450)
PMV BLD AUTO: 10.8 FL (ref 6–12)
POTASSIUM SERPL-SCNC: 4.5 MMOL/L (ref 3.5–5.2)
PROT SERPL-MCNC: 6.6 G/DL (ref 6–8.5)
RBC # BLD AUTO: 4.92 10*6/MM3 (ref 3.77–5.28)
SODIUM SERPL-SCNC: 140 MMOL/L (ref 136–145)
T4 FREE SERPL-MCNC: 1.16 NG/DL (ref 0.93–1.7)
TRIGL SERPL-MCNC: 55 MG/DL (ref 0–150)
TSH SERPL DL<=0.05 MIU/L-ACNC: 1.88 UIU/ML (ref 0.27–4.2)
VLDLC SERPL-MCNC: 11 MG/DL (ref 5–40)
WBC NRBC COR # BLD AUTO: 8.48 10*3/MM3 (ref 3.4–10.8)

## 2024-03-26 PROCEDURE — 84439 ASSAY OF FREE THYROXINE: CPT

## 2024-03-26 PROCEDURE — 86140 C-REACTIVE PROTEIN: CPT

## 2024-03-26 PROCEDURE — 85652 RBC SED RATE AUTOMATED: CPT

## 2024-03-26 PROCEDURE — 36415 COLL VENOUS BLD VENIPUNCTURE: CPT

## 2024-03-26 PROCEDURE — 93000 ELECTROCARDIOGRAM COMPLETE: CPT | Performed by: NURSE PRACTITIONER

## 2024-03-26 PROCEDURE — 80061 LIPID PANEL: CPT

## 2024-03-26 PROCEDURE — 99214 OFFICE O/P EST MOD 30 MIN: CPT | Performed by: NURSE PRACTITIONER

## 2024-03-26 PROCEDURE — 80050 GENERAL HEALTH PANEL: CPT

## 2024-03-26 NOTE — PROGRESS NOTES
Chief Complaint  Elevated Blood Pressure    NINO Crandall presents to [facility]    Patient complaining of elevated blood pressure readings.  Patient reports she has been feeling off so she started checking her blood pressure.  Patient reports she is getting readings of 140s-150s/90s.  Patient states when her blood pressure is elevated she is getting dull pain in her jugular vein, worse when she is sleeping.  Patient denies chest pain, shortness of air.  Patient states she does have episodes of 'trilling', like a big octavia of blood is going up her chest.   Pt does report dull headache when this first started.  Has any chest pain or edema.    Patient requesting referral to podiatry for chronic right foot pain.    Patient also complaining of fatigue and daytime somnolence.  States she does snore and often wakes herself up.  History of Present Illness  Past Medical History:   Diagnosis Date    Allergic     Arthritis     Asthma     Diabetes mellitus     GERD (gastroesophageal reflux disease)     HL (hearing loss)     Hypertension     Obesity     Pneumonia       Family History   Problem Relation Age of Onset    Diabetes Mother     Arthritis Mother     Other Mother         Degenerative Disc Disease    Diabetes Father     Hearing loss Father     Hyperlipidemia Father     Alcohol abuse Maternal Grandfather     Cancer Maternal Grandfather         Lung    Diabetes Maternal Grandfather     Early death Maternal Grandfather         Lung Cancer from smoking    Stroke Maternal Grandmother     Hearing loss Paternal Grandfather     Heart disease Paternal Grandfather     Hyperlipidemia Paternal Grandfather     Cancer Paternal Grandmother         Breast    Hyperlipidemia Paternal Grandmother     Vision loss Paternal Grandmother     Alcohol abuse Maternal Uncle     Mental illness Maternal Uncle     Alcohol abuse Maternal Aunt     Asthma Maternal Aunt     Depression Maternal Aunt     Drug abuse  "Maternal Aunt     Liver disease Maternal Aunt     Mental illness Maternal Aunt     Thyroid disease Maternal Aunt     Other Maternal Aunt         antiphospholipid antibody syndrome    Developmental Disability Paternal Aunt         Born with cerebral palsy    Miscarriages / Stillbirths Paternal Aunt     Miscarriages / Stillbirths Sister     Other Maternal Uncle         Degenerative Disc Disease      History reviewed. No pertinent surgical history.     Current Outpatient Medications:     diclofenac (CATAFLAM) 50 MG tablet, take 1 tablet by oral route 2 times every day, Disp: 180 tablet, Rfl: 1    folic acid (FOLVITE) 1 MG tablet, TAKE 2 TABLETS BY MOUTH DAILY, Disp: 180 tablet, Rfl: 0    hydroxychloroquine (Plaquenil) 200 MG tablet, take 2 tablet by oral route  every day, Disp: 60 tablet, Rfl: 0    Liver Extract (LIVER PO), Take  by mouth., Disp: , Rfl:     MAGNESIUM CHLORIDE PO, Take 2 tablets by mouth 2 (Two) Times a Day., Disp: , Rfl:     melatonin 5 MG tablet tablet, Take 1 tablet by mouth., Disp: , Rfl:     methotrexate 2.5 MG tablet, take 5 tablet by oral route  every week, Disp: 20 tablet, Rfl: 1    multivitamin with minerals tablet tablet, Take 1 tablet by mouth Daily., Disp: , Rfl:     OBJECTIVE  Vital Signs:   /74 (BP Location: Left arm, Patient Position: Sitting, Cuff Size: Large Adult)   Pulse 83   Temp 98.3 °F (36.8 °C) (Oral)   Ht 175.3 cm (69\")   Wt 121 kg (266 lb 9.6 oz)   SpO2 97%   BMI 39.37 kg/m²    Estimated body mass index is 39.37 kg/m² as calculated from the following:    Height as of this encounter: 175.3 cm (69\").    Weight as of this encounter: 121 kg (266 lb 9.6 oz).     Wt Readings from Last 3 Encounters:   03/26/24 121 kg (266 lb 9.6 oz)   11/13/23 117 kg (257 lb)   10/04/23 115 kg (254 lb)     BP Readings from Last 3 Encounters:   03/26/24 123/74   11/13/23 116/76   10/04/23 134/94       Physical Exam  Vitals reviewed.   Constitutional:       Appearance: Normal appearance. She " is well-developed.   HENT:      Head: Normocephalic and atraumatic.      Right Ear: External ear normal.      Left Ear: External ear normal.      Mouth/Throat:      Pharynx: No oropharyngeal exudate.   Eyes:      Conjunctiva/sclera: Conjunctivae normal.      Pupils: Pupils are equal, round, and reactive to light.   Cardiovascular:      Rate and Rhythm: Normal rate and regular rhythm.      Pulses: Normal pulses.      Heart sounds: Normal heart sounds. No murmur heard.     No friction rub. No gallop.   Pulmonary:      Effort: Pulmonary effort is normal.      Breath sounds: Normal breath sounds. No wheezing or rhonchi.   Skin:     General: Skin is warm and dry.   Neurological:      Mental Status: She is alert and oriented to person, place, and time.      Cranial Nerves: No cranial nerve deficit.   Psychiatric:         Mood and Affect: Mood and affect normal.         Behavior: Behavior normal.         Thought Content: Thought content normal.         Judgment: Judgment normal.          Result Review        No Images in the past 120 days found..      The above data has been reviewed by MICHAEL Hope 03/26/2024 08:45 EDT.      ECG 12 Lead    Date/Time: 3/26/2024 9:59 AM  Performed by: Frances Burciaga    Authorized by: Shira Corley APRN  Comparison: not compared with previous ECG   Previous ECG: no previous ECG available  Rhythm: sinus rhythm  Rate: normal  Conduction: conduction normal  ST Segments: ST segments normal  T Waves: T waves normal    Clinical impression: normal ECG           Patient Care Team:  Shira Corley APRN as PCP - General (Family Medicine)  Pat Beasley MD as Consulting Physician (Rheumatology)            ASSESSMENT & PLAN    Diagnoses and all orders for this visit:    1. Palpitations (Primary)  -     ECG 12 Lead  -     Adult Transthoracic Echo Complete W/ Cont if Necessary Per Protocol; Future  -     Holter monitor - 48 hour; Future  -     TSH+Free T4; Future    2. Chronic pain  of right heel  -     Ambulatory Referral to Podiatry    3. Dizziness  -     Duplex Carotid Ultrasound CAR; Future    4. Daytime somnolence  -     Ambulatory Referral to Sleep Medicine    5. Lipid screening  -     Lipid Panel With / Chol / HDL Ratio; Future         Tobacco Use: Low Risk  (3/26/2024)    Patient History     Smoking Tobacco Use: Never     Smokeless Tobacco Use: Never     Passive Exposure: Never       Follow Up     Return if symptoms worsen or fail to improve.      Patient was given instructions and counseling regarding her condition or for health maintenance advice. Please see specific information pulled into the AVS if appropriate.   I have reviewed information obtained and documented by others and I have confirmed the accuracy of this documented note.    Shira Corley, APRN

## 2024-04-05 ENCOUNTER — OFFICE VISIT (OUTPATIENT)
Dept: SLEEP MEDICINE | Facility: HOSPITAL | Age: 39
End: 2024-04-05
Payer: COMMERCIAL

## 2024-04-05 VITALS
HEIGHT: 69 IN | WEIGHT: 264.55 LBS | SYSTOLIC BLOOD PRESSURE: 133 MMHG | BODY MASS INDEX: 39.18 KG/M2 | HEART RATE: 78 BPM | OXYGEN SATURATION: 99 % | DIASTOLIC BLOOD PRESSURE: 79 MMHG

## 2024-04-05 DIAGNOSIS — R06.81 WITNESSED EPISODE OF APNEA: ICD-10-CM

## 2024-04-05 DIAGNOSIS — Z72.821 INADEQUATE SLEEP HYGIENE: ICD-10-CM

## 2024-04-05 DIAGNOSIS — R06.83 SNORING: ICD-10-CM

## 2024-04-05 DIAGNOSIS — E66.01 CLASS 2 SEVERE OBESITY WITH SERIOUS COMORBIDITY AND BODY MASS INDEX (BMI) OF 39.0 TO 39.9 IN ADULT, UNSPECIFIED OBESITY TYPE: ICD-10-CM

## 2024-04-05 DIAGNOSIS — R29.818 SUSPECTED SLEEP APNEA: Primary | ICD-10-CM

## 2024-04-05 DIAGNOSIS — G47.19 EXCESSIVE DAYTIME SLEEPINESS: ICD-10-CM

## 2024-04-05 PROCEDURE — G0463 HOSPITAL OUTPT CLINIC VISIT: HCPCS

## 2024-04-05 NOTE — PROGRESS NOTES
[facility]  [address] [place] [address]  [place]   [place] [address]  Phone: [phone]  Fax: [phone]      Paige Crandall  [id number]   1985  38 y.o.  female      Referring physician/provider and PCP: Shira Corley APRN    Type of service: Initial Sleep Medicine Consult.  Date of service: 4/5/2024      Chief Complaint   Patient presents with    Snoring    Witnessed Apnea       History of present illness;  The patient was seen today on 4/5/2024 at [facility].    Thank you for asking to see Paige Crandall, 38 y.o. PMHx Rheumatoid arthritis, GERD, obesity.  The patient presents for initial evaluation of sleep sleep disordered breathing.  Patient  denies prior surgery namely tonsillectomy, nasal surgery or UPPP.     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 Reference: Jin F et al. Br J Anaesth, 2012.     Criterion    Yes    No  Do you SNORE loudly?   [x]   Yes  []   No   Do you often feel TIRED, fatigued, or sleepy during the day?    [x]   Yes  []   No  Has anyone OBSERVED you stop breathing during your sleep?    [x]   Yes  []   No  Do you have or are you being treated for high blood PRESSURE?    [x]   Yes  []   No  BMI >32 kg/m2     [x]   Yes  []   No  AGE > 50 years    []   Yes  [x]   No  NECK circumference >16 inches / 40 cm    [x]   Yes  []   No  GENDER: male     []   Yes  [x]   No    LAURENCE Probability:  []   1-2 - Low  []   3-4 - Intermediate  [x]   5-8 - High    -Was diagnosed with HTN in the past but with lifestyle modifications resolved BP in sleep clinic 1 33/79 no longer on therapy for HTN  -Denies any known cardiopulmonary conditions/neurologic disorders/neuromuscular disorders  Never needed supplemental O2 at home  Denies any metal head/neck/chest    Further Sleep  History:    Bedtime: 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) 9:30 AM   Rise Time: 5 PM  Sleep Latency: Less than 20-minute  Screens in bed: Yes  Wake after sleep onset: 2-3 time  Reasons for awakenings: Pain  Number of naps per day non-daily up to 1 at most  Naps restorative: Denies  Caffeine use: 1 or more beverages per day coffee/tea/soda    RLS Symptoms: No   Bruxism:No   Current sleep related gastroesophageal reflux symptoms:  No   Cataplexy:  No   Sleep Paralysis:  No   Hypnagogic or hypnopompic hallucinations: No   Parasomnias such as sleep walking or sleep eating No     Disclaimer Sleep History: The above sleep history is based on this sleep physician's in room encounter with the patient. Pre encounter self administered questionnaires are taken into consideration and discussed with patient for any discordance. The above documentation by this sleep physician is the most accurate clinical information determined by in room sleep physician encounter with patient.     MEDICAL CONDITIONS (PMH)   GERD  Obesity  Rheumatoid arthritis    Social history:  Do you drive a commercial vehicle:  No   Shift work: Yes Works 3 nights a week 12-hour shifts  Does not cause her significant impairment social distress  No near-miss/MVC or workplace injury due to sleepiness   Tobacco use:  No   Alcohol use: 0 per week  Occupation: Patient care assistant works in [employer]    Family Hx (parents and siblings) (pertaining to sleep medicine)  Restless legs  Obesity    Medications: reviewed    Review of systems is negative unless otherwise noted per HPI   Disclaimer History: The above history is based on this sleep physician's in room encounter with the patient. Pre encounter self administered questionnaires are taken into consideration and discussed with patient for any discordance. The above documentation by this sleep physician is the most accurate clinical information determined by in room sleep physician encounter with patient.  "    Physical exam:  Vitals:    04/05/24 0900   BP: 133/79   Pulse: 78   SpO2: 99%   Weight: 120 kg (264 lb 8.8 oz)   Height: 175.3 cm (69.02\")    Body mass index is 39.05 kg/m².   CONSTITUTIONAL:  Non-toxic, In no overt distress   Head: normocephalic   ENT: Mallampati class IV, + macroglossia, no septal defects   NECK:Neck Circumference: 16 inches,no nuchal rigidity  RESPIRATORY SYSTEM: Breath sounds are clear (no rales, no rhonchi, no wheezes), no accessory muscle use  CARDIOVASULAR SYSTEM: Heart sounds are regular rhythm and normal rate, no rub, no gallop, no edema  NEUROLOGICAL SYSTEM: Oriented x 3, No gross focal deficits   PSYCHIATRIC SYSTEM: Goal oriented, affect full range appropriate      Office notes from care team reviewed:    -3/26/2024 office visit PCP MICHAEL Oneil excessive daytime sleepiness BMI that visit 39.37 kg/m²    Labs reviewed.  TSH          10/4/2023    14:28 3/26/2024    09:56   TSH   TSH 1.650  1.880       Most Recent A1C          10/4/2023    14:28   HGBA1C Most Recent   Hemoglobin A1C 5.30     - 3/26/2024  Bicarb 25      Assessment and plan:  Suspected sleep apnea [R29.818] patient's symptoms and examination is consistent with sleep apnea (G47.30). I have talked to the patient about the signs and symptoms of sleep apnea. In addition, I have also discussed pathophysiology of sleep apnea.  I also discussed the complications of untreated sleep apnea including effects on hypertension, diabetes mellitus and nonrestorative sleep with hypersomnia which can increase risk for motor vehicle accidents.  Untreated sleep apnea is also a risk factor for development of atrial fibrillation, hypertension, insulin resistance and cerebrovascular accident.  Discussed in detail of various testing methods including home-based and lab based sleep studies.  Based on history and physical examination and other comorbidities the most appropriate study is Home Sleep Study.  The order for the sleep study is " placed in Epic.  The test will be scheduled after approval from insurance. Treatment and management will be discussed after the test is completed.  Home Sleep Study.  The order for the sleep study is placed in Epic.  The test will be scheduled after approval from insurance. Treatment and management will be discussed after the test is completed. High pretest probability STOP-BANG 6/8, macroglossia, Mallampati is IV.  Home sleep study may rule in sleep apnea.  However, under patient's specific clinical circumstances home sleep study may not rule out sleep apnea.  If home sleep testing is negative must proceed with in laboratory polysomnography to definitively rule out sleep apnea (the prior was discussed with patient). Patient was given opportunity to ask questions and all the questions were answered.   -Counseled patient we will likely have a return to office visit to plan for any in lab testing if determined necessary as her sleep schedule is during day time from night shift work.  Snoring (R06.83), snoring is the sound created by turbulent airflow vibrating upper airway soft tissue due to limitation of inspiratory airflow. I have also discussed factors affecting snoring including sleep deprivation, sleeping on the back and alcohol ingestion. To minimize snoring, patient is advised to have adequate sleep, sleep on the side and avoid alcohol and sedative medications before bedtime  Excessive daytime sleepiness .  Patient endorses subjective excessive daytime sleepiness with sleep physician encounter which was inconsistent with patient's pre-encounter self-administered [place] Sleepiness Scale of Total score: 7.  There are many causes for daytime excessive sleepiness including depression, shiftwork syndrome, and other medical disorders including heart, kidney and liver failure.  From sleep disorders perspective this is sleep disordered breathing until proven otherwise. The most common cause of excessive sleepiness is  "due to sleep apnea with frequent awakenings during sleep time.  I have discussed safety of driving and to remain vigilant while driving; patient verbalized understanding of counseling.  Extensive Counseling NO Drowsy Driving:  -Counseled the patient if an automobile crash or near-crash occurs due to sleepiness or inattention, that the patient should stop driving and operating dangerous machinery until their sleep disorder has been treated and the patient no longer becomes sleepy or inattentive while driving. Counseled the patient it is their responsibility not to drive if they are sleepy or inattentive while driving. Counseled the patient that driving while drowsy may cause serious injury or death to to the patient or others. Counseled the patient to never place themself or others in situations where drowsiness can result in injury.  -Counseled the patient regarding the symptoms of drowsy driving include difficulty focusing, frequent blinking, heavy eyelids, daydreaming, wandering/disconnected thoughts, difficulty remembering the last few miles driven (sometimes called \"automatic behavior\") or missing exits and street signs, frequent yawning, rubbing eyes, difficulty keeping head up, drifting from tori to tori, tailgating or hitting a shoulder, hitting rumble strips on the road, and feeling restless and irritable.  -Counseled the patient drivers are often unaware of their own level of sleepiness, as individuals ability to self rate sleepiness and performance is often unreliable. Counseled the patient to err on the side of caution if they feel drowsy and to arrange for alternative modes of transportation such as ride sharing, public transportation, taxi, uber, friend-family, or walking. Counseld to plan ahead so they avoid driving during times of the day when they are likely to be sleepy such as early morning, mid-afternoon, late at night, or after a period of sleep deprivation, and to keep their trips short (under " 20 minutes) or arrange for alternatives mode of transport.  -Counseled the patient the best countermeasure for combating drowsy driving is to obtain adequate sleep, as sleep debt can only be repaid with sleep. Counseled if they experience drowsiness when driving, they should pull off the road to a safe area as soon as possible and sleep. Counseled even a brief 20-minute nap can improve neurobehavioral performance after sleep deprivation. Counseled longer naps may lead to sleep inertia, which can reduce performance during the first 30 minutes after waking. Patient verbalized understanding of my counseling instructions as stated above.   -Counseled to be most vigilant after early morning hours of night shift work as these are the times at greatest risk.   Obesity, patient's BMI is Body mass index is 39.05 kg/m².. I have discussed the relationship between weight and sleep apnea.There is direct correlation between weight and severity of sleep apnea.  Weight reduction is encouraged, as it is going to reduce the severity of sleep apnea. I have also discussed with the patient diet and exercise to achieve ideal body weight.  Inadequate sleep hygiene [Z72.821]  Counseled the patient lifestyle modifications as below to be applied especially night of any sleep study, the patient verbalized understanding of same. Follow up with PCP to reinforce my counseling towards healthy lifestyle modifications if sleep studies are negative.    I have also discussed with the patient the following  Sleep hygiene: Maintaining a regular bedtime and wake time, not to watch television or work in bed, limit caffeine-containing beverages before bed time and avoid naps during the day  Adequate amount of sleep.  Generally most people needs about 7 to 8 hours of sleep.      Return for 31 to 90 days after PAP setup with down load.  Patient's questions were answered      I once again thank you for asking me to see this patient in consultation and I have  forwarded my opinion and treatment plan.  Please do not hesitate to call me if you have any questions.       EMR Dragon/Transcription disclaimer:   Much of this encounter note is an electronic transcription/translation of spoken language to printed text. The electronic translation of spoken language may permit erroneous, or at times, nonsensical words or phrases to be inadvertently transcribed; Although I have reviewed the note for such errors, some may still exist.     NPI #: [id number]    Lisa Eller, DO  Sleep Medicine  [facility]  04/05/24

## 2024-04-25 ENCOUNTER — OFFICE VISIT (OUTPATIENT)
Dept: PODIATRY | Facility: CLINIC | Age: 39
End: 2024-04-25
Payer: COMMERCIAL

## 2024-04-25 VITALS
HEART RATE: 68 BPM | OXYGEN SATURATION: 97 % | DIASTOLIC BLOOD PRESSURE: 86 MMHG | WEIGHT: 271 LBS | TEMPERATURE: 98 F | HEIGHT: 69 IN | BODY MASS INDEX: 40.14 KG/M2 | SYSTOLIC BLOOD PRESSURE: 137 MMHG

## 2024-04-25 DIAGNOSIS — M72.2 PLANTAR FASCIITIS: ICD-10-CM

## 2024-04-25 DIAGNOSIS — M06.9 RHEUMATOID ARTHRITIS, INVOLVING UNSPECIFIED SITE, UNSPECIFIED WHETHER RHEUMATOID FACTOR PRESENT: ICD-10-CM

## 2024-04-25 DIAGNOSIS — M79.671 FOOT PAIN, RIGHT: Primary | ICD-10-CM

## 2024-04-25 RX ORDER — METHYLPREDNISOLONE 4 MG/1
TABLET ORAL
Qty: 21 TABLET | Refills: 0 | Status: SHIPPED | OUTPATIENT
Start: 2024-04-25

## 2024-04-25 NOTE — LETTER
April 25, 2024       No Recipients    Patient: Paige Crandall   YOB: 1985   Date of Visit: 4/25/2024       Dear MICHAEL Hope:    Thank you for referring Paige Crandall to me for evaluation. Below are the relevant portions of my assessment and plan of care.    Encounter Diagnosis and Orders:  Diagnoses and all orders for this visit:    1. Foot pain, right (Primary)    2. Rheumatoid arthritis, involving unspecified site, unspecified whether rheumatoid factor present    3. Plantar fasciitis  -     methylPREDNISolone (MEDROL) 4 MG dose pack; Take as directed  Dispense: 21 tablet; Refill: 0  -     Ambulatory Referral For Orthotics        If you have questions, please do not hesitate to call me. I look forward to following Paige along with you.         Sincerely,        Rory Hinojosa DPM        CC:   No Recipients

## 2024-04-26 ENCOUNTER — PATIENT ROUNDING (BHMG ONLY) (OUTPATIENT)
Dept: PODIATRY | Facility: CLINIC | Age: 39
End: 2024-04-26
Payer: COMMERCIAL

## 2024-04-26 NOTE — PROGRESS NOTES
A [employer]agg[employer] message has been sent to the patient for PATIENT ROUNDING with [facility]

## 2024-05-22 ENCOUNTER — LAB (OUTPATIENT)
Dept: LAB | Facility: HOSPITAL | Age: 39
End: 2024-05-22
Payer: COMMERCIAL

## 2024-05-22 ENCOUNTER — HOSPITAL ENCOUNTER (OUTPATIENT)
Dept: CARDIOLOGY | Facility: HOSPITAL | Age: 39
Discharge: HOME | End: 2024-05-22
Payer: COMMERCIAL

## 2024-05-22 ENCOUNTER — HOSPITAL ENCOUNTER (OUTPATIENT)
Facility: HOSPITAL | Age: 39
Discharge: HOME | End: 2024-05-22
Payer: COMMERCIAL

## 2024-05-22 ENCOUNTER — TRANSCRIBE ORDERS (OUTPATIENT)
Dept: ADMINISTRATIVE | Facility: HOSPITAL | Age: 39
End: 2024-05-22
Payer: COMMERCIAL

## 2024-05-22 DIAGNOSIS — Z79.899 ENCOUNTER FOR LONG-TERM (CURRENT) USE OF OTHER MEDICATIONS: ICD-10-CM

## 2024-05-22 DIAGNOSIS — R42 DIZZINESS: ICD-10-CM

## 2024-05-22 DIAGNOSIS — M06.09 RHEUMATOID ARTHRITIS OF MULTIPLE SITES WITHOUT RHEUMATOID FACTOR: Primary | ICD-10-CM

## 2024-05-22 DIAGNOSIS — R00.2 PALPITATIONS: ICD-10-CM

## 2024-05-22 DIAGNOSIS — M06.09 RHEUMATOID ARTHRITIS OF MULTIPLE SITES WITHOUT RHEUMATOID FACTOR: ICD-10-CM

## 2024-05-22 LAB
ALBUMIN SERPL-MCNC: 4.3 G/DL (ref 3.5–5.2)
ALBUMIN/GLOB SERPL: 1.7 G/DL
ALP SERPL-CCNC: 66 U/L (ref 39–117)
ALT SERPL W P-5'-P-CCNC: 27 U/L (ref 1–33)
ANION GAP SERPL CALCULATED.3IONS-SCNC: 10.4 MMOL/L (ref 5–15)
AORTIC DIMENSIONLESS INDEX: 0.71 (DI)
AST SERPL-CCNC: 22 U/L (ref 1–32)
BASOPHILS # BLD AUTO: 0.07 10*3/MM3 (ref 0–0.2)
BASOPHILS NFR BLD AUTO: 0.9 % (ref 0–1.5)
BH CV ECHO MEAS - ACS: 2.2 CM
BH CV ECHO MEAS - AO MAX PG: 7.6 MMHG
BH CV ECHO MEAS - AO MEAN PG: 4 MMHG
BH CV ECHO MEAS - AO ROOT DIAM: 3.1 CM
BH CV ECHO MEAS - AO V2 MAX: 138 CM/SEC
BH CV ECHO MEAS - AO V2 VTI: 28.2 CM
BH CV ECHO MEAS - AVA(I,D): 2.47 CM2
BH CV ECHO MEAS - EDV(CUBED): 103.8 ML
BH CV ECHO MEAS - EDV(MOD-SP2): 75.4 ML
BH CV ECHO MEAS - EDV(MOD-SP4): 95.5 ML
BH CV ECHO MEAS - EF(MOD-BP): 61.5 %
BH CV ECHO MEAS - EF(MOD-SP2): 60.1 %
BH CV ECHO MEAS - EF(MOD-SP4): 64.2 %
BH CV ECHO MEAS - ESV(CUBED): 35.9 ML
BH CV ECHO MEAS - ESV(MOD-SP2): 30.1 ML
BH CV ECHO MEAS - ESV(MOD-SP4): 34.2 ML
BH CV ECHO MEAS - FS: 29.8 %
BH CV ECHO MEAS - IVS/LVPW: 0.9 CM
BH CV ECHO MEAS - IVSD: 0.9 CM
BH CV ECHO MEAS - LA DIMENSION: 4 CM
BH CV ECHO MEAS - LAT PEAK E' VEL: 14.5 CM/SEC
BH CV ECHO MEAS - LV MASS(C)D: 153.4 GRAMS
BH CV ECHO MEAS - LV MAX PG: 3.4 MMHG
BH CV ECHO MEAS - LV MEAN PG: 2 MMHG
BH CV ECHO MEAS - LV V1 MAX: 91.7 CM/SEC
BH CV ECHO MEAS - LV V1 VTI: 20.1 CM
BH CV ECHO MEAS - LVIDD: 4.7 CM
BH CV ECHO MEAS - LVIDS: 3.3 CM
BH CV ECHO MEAS - LVOT AREA: 3.5 CM2
BH CV ECHO MEAS - LVOT DIAM: 2.1 CM
BH CV ECHO MEAS - LVPWD: 1 CM
BH CV ECHO MEAS - MED PEAK E' VEL: 10.1 CM/SEC
BH CV ECHO MEAS - MV A MAX VEL: 97.5 CM/SEC
BH CV ECHO MEAS - MV DEC SLOPE: 1124 CM/SEC2
BH CV ECHO MEAS - MV DEC TIME: 0.16 SEC
BH CV ECHO MEAS - MV E MAX VEL: 83.3 CM/SEC
BH CV ECHO MEAS - MV E/A: 0.85
BH CV ECHO MEAS - MV MEAN PG: 2 MMHG
BH CV ECHO MEAS - MV P1/2T: 30.5 MSEC
BH CV ECHO MEAS - MV V2 VTI: 21.3 CM
BH CV ECHO MEAS - MVA(P1/2T): 7.2 CM2
BH CV ECHO MEAS - MVA(VTI): 3.3 CM2
BH CV ECHO MEAS - PA V2 MAX: 91.4 CM/SEC
BH CV ECHO MEAS - PULM A REVS DUR: 0.11 SEC
BH CV ECHO MEAS - PULM A REVS VEL: 38.1 CM/SEC
BH CV ECHO MEAS - PULM DIAS VEL: 36.7 CM/SEC
BH CV ECHO MEAS - PULM S/D: 1.61
BH CV ECHO MEAS - PULM SYS VEL: 59.1 CM/SEC
BH CV ECHO MEAS - QP/QS: 1.37
BH CV ECHO MEAS - RV MAX PG: 3.2 MMHG
BH CV ECHO MEAS - RV V1 MAX: 89.4 CM/SEC
BH CV ECHO MEAS - RV V1 VTI: 22.9 CM
BH CV ECHO MEAS - RVDD: 3.3 CM
BH CV ECHO MEAS - RVOT DIAM: 2.3 CM
BH CV ECHO MEAS - SV(LVOT): 69.6 ML
BH CV ECHO MEAS - SV(MOD-SP2): 45.3 ML
BH CV ECHO MEAS - SV(MOD-SP4): 61.3 ML
BH CV ECHO MEAS - SV(RVOT): 95.1 ML
BH CV ECHO MEAS - TAPSE (>1.6): 2.14 CM
BH CV ECHO MEASUREMENTS AVERAGE E/E' RATIO: 6.77
BH CV XLRA - TDI S': 17.3 CM/SEC
BH CV XLRA MEAS LEFT CAROTID BULB EDV: 26 CM/SEC
BH CV XLRA MEAS LEFT CAROTID BULB PSV: 77 CM/SEC
BH CV XLRA MEAS LEFT DIST CCA EDV: 29 CM/SEC
BH CV XLRA MEAS LEFT DIST CCA PSV: 74.4 CM/SEC
BH CV XLRA MEAS LEFT DIST ICA EDV: 45.8 CM/SEC
BH CV XLRA MEAS LEFT DIST ICA PSV: 99 CM/SEC
BH CV XLRA MEAS LEFT ICA/CCA RATIO: 1
BH CV XLRA MEAS LEFT MID ICA EDV: 32.5 CM/SEC
BH CV XLRA MEAS LEFT MID ICA PSV: 74 CM/SEC
BH CV XLRA MEAS LEFT PROX CCA EDV: 23.8 CM/SEC
BH CV XLRA MEAS LEFT PROX CCA PSV: 81.8 CM/SEC
BH CV XLRA MEAS LEFT PROX ECA EDV: 14.7 CM/SEC
BH CV XLRA MEAS LEFT PROX ECA PSV: 78.1 CM/SEC
BH CV XLRA MEAS LEFT PROX ICA EDV: 34.2 CM/SEC
BH CV XLRA MEAS LEFT PROX ICA PSV: 71.4 CM/SEC
BH CV XLRA MEAS LEFT VERTEBRAL A EDV: 16.7 CM/SEC
BH CV XLRA MEAS LEFT VERTEBRAL A PSV: 43.2 CM/SEC
BH CV XLRA MEAS RIGHT CAROTID BULB EDV: 21.7 CM/SEC
BH CV XLRA MEAS RIGHT CAROTID BULB PSV: 85.2 CM/SEC
BH CV XLRA MEAS RIGHT DIST CCA EDV: 28.6 CM/SEC
BH CV XLRA MEAS RIGHT DIST CCA PSV: 83.6 CM/SEC
BH CV XLRA MEAS RIGHT DIST ICA EDV: 42.8 CM/SEC
BH CV XLRA MEAS RIGHT DIST ICA PSV: 77 CM/SEC
BH CV XLRA MEAS RIGHT ICA/CCA RATIO: 0.8
BH CV XLRA MEAS RIGHT MID ICA EDV: 34.9 CM/SEC
BH CV XLRA MEAS RIGHT MID ICA PSV: 76.2 CM/SEC
BH CV XLRA MEAS RIGHT PROX CCA EDV: 28 CM/SEC
BH CV XLRA MEAS RIGHT PROX CCA PSV: 95.7 CM/SEC
BH CV XLRA MEAS RIGHT PROX ECA EDV: 20.3 CM/SEC
BH CV XLRA MEAS RIGHT PROX ECA PSV: 78.8 CM/SEC
BH CV XLRA MEAS RIGHT PROX ICA EDV: 19.5 CM/SEC
BH CV XLRA MEAS RIGHT PROX ICA PSV: 63.3 CM/SEC
BH CV XLRA MEAS RIGHT VERTEBRAL A EDV: 12.6 CM/SEC
BH CV XLRA MEAS RIGHT VERTEBRAL A PSV: 35.9 CM/SEC
BILIRUB SERPL-MCNC: 0.7 MG/DL (ref 0–1.2)
BUN SERPL-MCNC: 8 MG/DL (ref 6–20)
BUN/CREAT SERPL: 9.9 (ref 7–25)
CALCIUM SPEC-SCNC: 9.3 MG/DL (ref 8.6–10.5)
CHLORIDE SERPL-SCNC: 110 MMOL/L (ref 98–107)
CO2 SERPL-SCNC: 21.6 MMOL/L (ref 22–29)
CREAT SERPL-MCNC: 0.81 MG/DL (ref 0.57–1)
CRP SERPL-MCNC: 0.47 MG/DL (ref 0–0.5)
DEPRECATED RDW RBC AUTO: 40.4 FL (ref 37–54)
EGFRCR SERPLBLD CKD-EPI 2021: 94.2 ML/MIN/1.73
EOSINOPHIL # BLD AUTO: 0.11 10*3/MM3 (ref 0–0.4)
EOSINOPHIL NFR BLD AUTO: 1.4 % (ref 0.3–6.2)
ERYTHROCYTE [DISTWIDTH] IN BLOOD BY AUTOMATED COUNT: 13.1 % (ref 12.3–15.4)
ERYTHROCYTE [SEDIMENTATION RATE] IN BLOOD: 5 MM/HR (ref 0–20)
GLOBULIN UR ELPH-MCNC: 2.6 GM/DL
GLUCOSE SERPL-MCNC: 85 MG/DL (ref 65–99)
HCT VFR BLD AUTO: 43 % (ref 34–46.6)
HGB BLD-MCNC: 14.9 G/DL (ref 12–15.9)
IMM GRANULOCYTES # BLD AUTO: 0.02 10*3/MM3 (ref 0–0.05)
IMM GRANULOCYTES NFR BLD AUTO: 0.3 % (ref 0–0.5)
IVRT: 87 MS
LEFT ARM BP: NORMAL MMHG
LEFT ATRIUM VOLUME INDEX: 14.7 ML/M2
LYMPHOCYTES # BLD AUTO: 2.23 10*3/MM3 (ref 0.7–3.1)
LYMPHOCYTES NFR BLD AUTO: 29 % (ref 19.6–45.3)
MCH RBC QN AUTO: 30.1 PG (ref 26.6–33)
MCHC RBC AUTO-ENTMCNC: 34.7 G/DL (ref 31.5–35.7)
MCV RBC AUTO: 86.9 FL (ref 79–97)
MONOCYTES # BLD AUTO: 0.44 10*3/MM3 (ref 0.1–0.9)
MONOCYTES NFR BLD AUTO: 5.7 % (ref 5–12)
NEUTROPHILS NFR BLD AUTO: 4.82 10*3/MM3 (ref 1.7–7)
NEUTROPHILS NFR BLD AUTO: 62.7 % (ref 42.7–76)
NRBC BLD AUTO-RTO: 0 /100 WBC (ref 0–0.2)
PLATELET # BLD AUTO: 296 10*3/MM3 (ref 140–450)
PMV BLD AUTO: 11 FL (ref 6–12)
POTASSIUM SERPL-SCNC: 4.4 MMOL/L (ref 3.5–5.2)
PROT SERPL-MCNC: 6.9 G/DL (ref 6–8.5)
RBC # BLD AUTO: 4.95 10*6/MM3 (ref 3.77–5.28)
RIGHT ARM BP: NORMAL MMHG
SODIUM SERPL-SCNC: 142 MMOL/L (ref 136–145)
WBC NRBC COR # BLD AUTO: 7.69 10*3/MM3 (ref 3.4–10.8)

## 2024-05-22 PROCEDURE — 80053 COMPREHEN METABOLIC PANEL: CPT

## 2024-05-22 PROCEDURE — 86140 C-REACTIVE PROTEIN: CPT

## 2024-05-22 PROCEDURE — 85652 RBC SED RATE AUTOMATED: CPT

## 2024-05-22 PROCEDURE — 85025 COMPLETE CBC W/AUTO DIFF WBC: CPT

## 2024-05-22 PROCEDURE — 93306 TTE W/DOPPLER COMPLETE: CPT

## 2024-05-22 PROCEDURE — 36415 COLL VENOUS BLD VENIPUNCTURE: CPT

## 2024-05-22 PROCEDURE — 93880 EXTRACRANIAL BILAT STUDY: CPT

## 2024-06-19 ENCOUNTER — LAB (OUTPATIENT)
Dept: LAB | Facility: HOSPITAL | Age: 39
End: 2024-06-19
Payer: COMMERCIAL

## 2024-06-19 ENCOUNTER — TRANSCRIBE ORDERS (OUTPATIENT)
Dept: LAB | Facility: HOSPITAL | Age: 39
End: 2024-06-19
Payer: COMMERCIAL

## 2024-06-19 DIAGNOSIS — M06.09 RHEUMATOID ARTHRITIS OF MULTIPLE SITES WITHOUT RHEUMATOID FACTOR: Primary | ICD-10-CM

## 2024-06-19 DIAGNOSIS — Z79.899 ENCOUNTER FOR LONG-TERM (CURRENT) USE OF MEDICATIONS: ICD-10-CM

## 2024-06-19 DIAGNOSIS — M06.09 RHEUMATOID ARTHRITIS OF MULTIPLE SITES WITHOUT RHEUMATOID FACTOR: ICD-10-CM

## 2024-06-19 LAB
ALBUMIN SERPL-MCNC: 4.3 G/DL (ref 3.5–5.2)
ALBUMIN/GLOB SERPL: 1.8 G/DL
ALP SERPL-CCNC: 65 U/L (ref 39–117)
ALT SERPL W P-5'-P-CCNC: 27 U/L (ref 1–33)
ANION GAP SERPL CALCULATED.3IONS-SCNC: 7 MMOL/L (ref 5–15)
AST SERPL-CCNC: 15 U/L (ref 1–32)
BASOPHILS # BLD AUTO: 0.06 10*3/MM3 (ref 0–0.2)
BASOPHILS NFR BLD AUTO: 0.9 % (ref 0–1.5)
BILIRUB SERPL-MCNC: 0.5 MG/DL (ref 0–1.2)
BUN SERPL-MCNC: 11 MG/DL (ref 6–20)
BUN/CREAT SERPL: 11.1 (ref 7–25)
CALCIUM SPEC-SCNC: 9 MG/DL (ref 8.6–10.5)
CHLORIDE SERPL-SCNC: 106 MMOL/L (ref 98–107)
CO2 SERPL-SCNC: 27 MMOL/L (ref 22–29)
CREAT SERPL-MCNC: 0.99 MG/DL (ref 0.57–1)
CRP SERPL-MCNC: 0.43 MG/DL (ref 0–0.5)
DEPRECATED RDW RBC AUTO: 40.8 FL (ref 37–54)
EGFRCR SERPLBLD CKD-EPI 2021: 74.1 ML/MIN/1.73
EOSINOPHIL # BLD AUTO: 0.13 10*3/MM3 (ref 0–0.4)
EOSINOPHIL NFR BLD AUTO: 1.9 % (ref 0.3–6.2)
ERYTHROCYTE [DISTWIDTH] IN BLOOD BY AUTOMATED COUNT: 13 % (ref 12.3–15.4)
ERYTHROCYTE [SEDIMENTATION RATE] IN BLOOD: 1 MM/HR (ref 0–20)
GLOBULIN UR ELPH-MCNC: 2.4 GM/DL
GLUCOSE SERPL-MCNC: 89 MG/DL (ref 65–99)
HCT VFR BLD AUTO: 42.6 % (ref 34–46.6)
HGB BLD-MCNC: 14.4 G/DL (ref 12–15.9)
IMM GRANULOCYTES # BLD AUTO: 0.01 10*3/MM3 (ref 0–0.05)
IMM GRANULOCYTES NFR BLD AUTO: 0.1 % (ref 0–0.5)
LYMPHOCYTES # BLD AUTO: 2.1 10*3/MM3 (ref 0.7–3.1)
LYMPHOCYTES NFR BLD AUTO: 30.7 % (ref 19.6–45.3)
MCH RBC QN AUTO: 29.5 PG (ref 26.6–33)
MCHC RBC AUTO-ENTMCNC: 33.8 G/DL (ref 31.5–35.7)
MCV RBC AUTO: 87.3 FL (ref 79–97)
MONOCYTES # BLD AUTO: 0.41 10*3/MM3 (ref 0.1–0.9)
MONOCYTES NFR BLD AUTO: 6 % (ref 5–12)
NEUTROPHILS NFR BLD AUTO: 4.14 10*3/MM3 (ref 1.7–7)
NEUTROPHILS NFR BLD AUTO: 60.4 % (ref 42.7–76)
NRBC BLD AUTO-RTO: 0 /100 WBC (ref 0–0.2)
PLATELET # BLD AUTO: 284 10*3/MM3 (ref 140–450)
PMV BLD AUTO: 10.4 FL (ref 6–12)
POTASSIUM SERPL-SCNC: 4.6 MMOL/L (ref 3.5–5.2)
PROT SERPL-MCNC: 6.7 G/DL (ref 6–8.5)
RBC # BLD AUTO: 4.88 10*6/MM3 (ref 3.77–5.28)
SODIUM SERPL-SCNC: 140 MMOL/L (ref 136–145)
WBC NRBC COR # BLD AUTO: 6.85 10*3/MM3 (ref 3.4–10.8)

## 2024-06-19 PROCEDURE — 85652 RBC SED RATE AUTOMATED: CPT

## 2024-06-19 PROCEDURE — 86140 C-REACTIVE PROTEIN: CPT

## 2024-06-19 PROCEDURE — 80053 COMPREHEN METABOLIC PANEL: CPT

## 2024-06-19 PROCEDURE — 36415 COLL VENOUS BLD VENIPUNCTURE: CPT

## 2024-06-19 PROCEDURE — 85025 COMPLETE CBC W/AUTO DIFF WBC: CPT

## 2024-06-24 ENCOUNTER — HOSPITAL ENCOUNTER (OUTPATIENT)
Dept: CARDIOLOGY | Facility: HOSPITAL | Age: 39
Discharge: HOME | End: 2024-06-24
Admitting: NURSE PRACTITIONER
Payer: COMMERCIAL

## 2024-06-24 DIAGNOSIS — R00.2 PALPITATIONS: ICD-10-CM

## 2024-06-24 PROCEDURE — 93225 XTRNL ECG REC<48 HRS REC: CPT

## 2024-07-01 ENCOUNTER — TELEPHONE (OUTPATIENT)
Dept: FAMILY MEDICINE CLINIC | Facility: CLINIC | Age: 39
End: 2024-07-01
Payer: COMMERCIAL

## 2024-07-01 DIAGNOSIS — Z12.31 SCREENING MAMMOGRAM FOR BREAST CANCER: Primary | ICD-10-CM

## 2024-07-01 NOTE — TELEPHONE ENCOUNTER
Patient requesting Rx for Zofran for gall bladder attacks.  Patient consulted General Surgery 11/13/23 regarding same.  Patient has not followed up with [facility] since then.    Acute OV 3/26/24  Last f/u 10/4/23  No f/u appt scheduled

## 2024-07-02 DIAGNOSIS — R00.2 PALPITATIONS: Primary | ICD-10-CM

## 2024-07-02 RX ORDER — ONDANSETRON 4 MG/1
4 TABLET, FILM COATED ORAL EVERY 8 HOURS PRN
Qty: 30 TABLET | Refills: 0 | Status: SHIPPED | OUTPATIENT
Start: 2024-07-02

## 2024-08-01 ENCOUNTER — OFFICE VISIT (OUTPATIENT)
Dept: CARDIOLOGY | Facility: CLINIC | Age: 39
End: 2024-08-01
Payer: COMMERCIAL

## 2024-08-01 VITALS
OXYGEN SATURATION: 96 % | BODY MASS INDEX: 40.23 KG/M2 | SYSTOLIC BLOOD PRESSURE: 133 MMHG | HEIGHT: 69 IN | DIASTOLIC BLOOD PRESSURE: 82 MMHG | WEIGHT: 271.6 LBS | HEART RATE: 89 BPM

## 2024-08-01 DIAGNOSIS — E11.9 TYPE 2 DIABETES MELLITUS WITHOUT COMPLICATION, WITHOUT LONG-TERM CURRENT USE OF INSULIN: ICD-10-CM

## 2024-08-01 DIAGNOSIS — I10 ESSENTIAL HYPERTENSION: ICD-10-CM

## 2024-08-01 DIAGNOSIS — R00.2 PALPITATIONS: Primary | ICD-10-CM

## 2024-08-01 PROBLEM — I49.3 PVC'S (PREMATURE VENTRICULAR CONTRACTIONS): Status: ACTIVE | Noted: 2024-08-01

## 2024-08-01 NOTE — PROGRESS NOTES
[facility]  Interventional Cardiology Patient Visit Note      Referring Provider:  Shira Corley, APRN  [address]  [address]  [place] [address]    Reason for Referral:   Palpitations  History of Presenting Illness:  Paige Crandall presents to clinic today for evaluation of palpitations.    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  Since February, she has noted episodes of palpitations occurring every other day lasting for few seconds.  She reports feeling stronger heartbeat followed by a weak heartbeat, which would sometimes occur frequently and make her short of breath.  She associated it with increased caffeine use and a heavy meal.  She has noted significant reduction in activities after minimizing her caffeine intake.  She does not report presyncope, syncope, peripheral edema, angina, exertional dyspnea.    She has no family history of arrhythmias or premature coronary artery disease.  She denies smoking, doing illicit drugs or drinking alcohol.              Past Medical History  Past Medical History:   Diagnosis Date    Achilles tendon pain     Allergic     Arthritis     Asthma     Diabetes mellitus     Foot pain, right     GERD (gastroesophageal reflux disease)     HL (hearing loss)     Hypertension     Obesity     Pain of right heel     Pneumonia     RLS (restless legs syndrome)          Current Outpatient Medications:     diclofenac (CATAFLAM) 50 MG tablet, take 1 tablet by oral route 2 times every day, Disp: 180 tablet, Rfl: 1    folic acid (FOLVITE) 1 MG tablet, TAKE 2 TABLETS BY MOUTH DAILY, Disp: 180 tablet, Rfl: 0    hydroxychloroquine (Plaquenil) 200 MG tablet, take 2 tablets by oral route every day, Disp: 180 tablet, Rfl: 1    Liver Extract (LIVER PO), Take  by mouth., Disp: , Rfl:     MAGNESIUM CHLORIDE PO, Take 2 tablets by mouth 2 (Two) Times a Day., Disp: , Rfl:     melatonin 5 MG tablet tablet, Take 1 tablet by mouth., Disp: ,  "Rfl:     methotrexate 2.5 MG tablet, Take 5 tablets by mouth once weekly, Disp: 60 tablet, Rfl: 0    multivitamin with minerals tablet tablet, Take 1 tablet by mouth Daily., Disp: , Rfl:     ondansetron (Zofran) 4 MG tablet, Take 1 tablet by mouth Every 8 (Eight) Hours As Needed for Nausea or Vomiting., Disp: 30 tablet, Rfl: 0    methylPREDNISolone (MEDROL) 4 MG dose pack, Take as directed (Patient not taking: Reported on 8/1/2024), Disp: 21 tablet, Rfl: 0  Current outpatient and discharge medications have been reconciled for the patient.  Reviewed by: Chavo Ribeiro MD     There are no discontinued medications.    Allergies   Allergen Reactions    Estrogens Unknown - Low Severity     Hair loss, weight gain, \"mental issues\" per pt     Lisinopril Cough    Latex Dermatitis        Social History     Tobacco Use    Smoking status: Never     Passive exposure: Never    Smokeless tobacco: Never   Vaping Use    Vaping status: Never Used   Substance Use Topics    Alcohol use: Yes     Comment: occ    Drug use: Never     Comment: Family history of substance abuse       Family History   Problem Relation Age of Onset    Diabetes Mother     Arthritis Mother     Other Mother         Degenerative Disc Disease    Obesity Mother     Diabetes Father     Hearing loss Father     Hyperlipidemia Father     Restless legs syndrome Father     Obesity Father     Miscarriages / Stillbirths Sister     Alcohol abuse Maternal Aunt     Asthma Maternal Aunt     Depression Maternal Aunt     Drug abuse Maternal Aunt     Liver disease Maternal Aunt     Mental illness Maternal Aunt     Thyroid disease Maternal Aunt     Other Maternal Aunt         antiphospholipid antibody syndrome    Alcohol abuse Maternal Uncle     Mental illness Maternal Uncle     Other Maternal Uncle         Degenerative Disc Disease    Developmental Disability Paternal Aunt         Born with cerebral palsy    Miscarriages / Stillbirths Paternal Aunt     Stroke Maternal Grandmother  " "   Alcohol abuse Maternal Grandfather     Cancer Maternal Grandfather         Lung    Diabetes Maternal Grandfather     Early death Maternal Grandfather         Lung Cancer from smoking    Cancer Paternal Grandmother         Breast    Hyperlipidemia Paternal Grandmother     Vision loss Paternal Grandmother     Hearing loss Paternal Grandfather     Heart disease Paternal Grandfather     Hyperlipidemia Paternal Grandfather           Objective   /82 (BP Location: Left arm, Patient Position: Sitting, Cuff Size: Large Adult)   Pulse 89   Ht 175.3 cm (69\")   Wt 123 kg (271 lb 9.6 oz)   SpO2 96%   BMI 40.11 kg/m²     Wt Readings from Last 3 Encounters:   08/01/24 123 kg (271 lb 9.6 oz)   04/25/24 123 kg (271 lb)   04/05/24 120 kg (264 lb 8.8 oz)     BP Readings from Last 3 Encounters:   08/01/24 133/82   04/25/24 137/86   04/05/24 133/79       Physical Exam  Constitutional:  Awake. Not in acute distress. Normal appearance.   Neck: No carotid bruit, hepatojugular reflux or JVD.   Cardiovascular:      Rate and Rhythm: Normal rate and regular rhythm.      Chest Wall: PMI is not displaced.      Heart sounds: Normal heart sounds, S1 normal and S2 normal. No murmur heard.       No friction rub. No gallop. No S3 or S4 sounds.    Pulmonary: Pulmonary effort is normal. Normal breath sounds. No wheezing, rhonchi or rales.   Extremities: No Bilateral edema is noted.   Skin: Warm and dry. Non cyanotic, No petechiae or rash.   Neurological: Alert and oriented x 3  Psychiatric:  Behavior is cooperative.       Result Review :   The following data was reviewed by Chavo Ribeiro MD on 08/01/2024   No results found for: \"PROBNP\"  CMP          3/26/2024    09:56 5/22/2024    09:36 6/19/2024    15:29   CMP   Glucose 86  85  89    BUN 12  8  11    Creatinine 0.98  0.81  0.99    EGFR 75.5  94.2  74.1    Sodium 140  142  140    Potassium 4.5  4.4  4.6    Chloride 106  110  106    Calcium 9.3  9.3  9.0    Total Protein 6.6  6.9  6.7  " "  Albumin 4.4  4.3  4.3    Globulin 2.2  2.6  2.4    Total Bilirubin 0.5  0.7  0.5    Alkaline Phosphatase 68  66  65    AST (SGOT) 17  22  15    ALT (SGPT) 21  27  27    Albumin/Globulin Ratio 2.0  1.7  1.8    BUN/Creatinine Ratio 12.2  9.9  11.1    Anion Gap 9.0  10.4  7.0      CBC w/diff          3/26/2024    09:56 5/22/2024    09:36 6/19/2024    15:29   CBC w/Diff   WBC 8.48  7.69  6.85    RBC 4.92  4.95  4.88    Hemoglobin 14.3  14.9  14.4    Hematocrit 41.9  43.0  42.6    MCV 85.2  86.9  87.3    MCH 29.1  30.1  29.5    MCHC 34.1  34.7  33.8    RDW 13.3  13.1  13.0    Platelets 262  296  284    Neutrophil Rel % 65.9  62.7  60.4    Immature Granulocyte Rel % 0.4  0.3  0.1    Lymphocyte Rel % 25.9  29.0  30.7    Monocyte Rel % 5.9  5.7  6.0    Eosinophil Rel % 1.3  1.4  1.9    Basophil Rel % 0.6  0.9  0.9       Lab Results   Component Value Date    TSH 1.880 03/26/2024      Lab Results   Component Value Date    FREET4 1.16 03/26/2024      No results found for: \"DDIMERQUANT\"  No results found for: \"MG\"   No results found for: \"DIGOXIN\"   No results found for: \"TROPONINT\"   No results found for: \"POCTROP\"       A1C Last 3 Results          10/4/2023    14:28   HGBA1C Last 3 Results   Hemoglobin A1C 5.30      Lipid Panel          10/4/2023    14:28 3/26/2024    09:56   Lipid Panel   Total Cholesterol 149  155    Triglycerides 85  55    HDL Cholesterol 53  59    VLDL Cholesterol 16  11    LDL Cholesterol  80  85    LDL/HDL Ratio 1.49       Results for orders placed during the hospital encounter of 05/22/24    Adult Transthoracic Echo Complete W/ Cont if Necessary Per Protocol    Interpretation Summary  Normal ventricular systolic function.  No significant valve abnormalities noted     Results for orders placed during the hospital encounter of 05/22/24    Adult Transthoracic Echo Complete W/ Cont if Necessary Per Protocol    Interpretation Summary  Normal ventricular systolic function.  No significant valve " abnormalities noted     No results found for this or any previous visit.          The 10-year ASCVD risk score (El NIXON, et al., 2019) is: 0.3%    Values used to calculate the score:      Age: 39 years      Sex: Female      Is Non- : No      Diabetic: No      Tobacco smoker: No      Systolic Blood Pressure: 133 mmHg      Is BP treated: No      HDL Cholesterol: 59 mg/dL      Total Cholesterol: 155 mg/dL        Assessment  1. Palpitations    2. Essential hypertension    3. Type 2 diabetes mellitus without complication, without long-term current use of insulin        Plan    Patient's Holter monitor, EKG and echocardiogram were reviewed.  A total of 2% PVC burden were recorded on Holter monitoring over that time span of 48 hours approximately.  As such, it is not compromising quality of life therefore patient has opted for conservative management.  Therefore I will hold off beta-blocker or further testing.  EKG does not show QT prolongation, Brugada like pattern.  Patient's blood pressure has ranged from 130s to 140s on home monitoring lately.  This has happened in the past however from significant weight loss patient has achieved optimal blood pressure control and want to try weight loss plan again prior to starting medical management.  She  It was discussed with the patient to monitor electrolytes while trying medical diet for weight loss as it can increase her risk of PVCs and arrhythmias.  Patient has had prior A1c as high as 9 approximately she has achieved A1c of 5.8 with weight loss as well and is currently not on medical management for diabetes.  Patient overall ASCVD risk score does not warrant statin therapy either.    For now, informed decision was made to adopt optimal blood pressure control and diabetes control with dietary and lifestyle modification and doing weight loss plan as discussed above.  If in 3 months, now if patient not able to achieve optimal blood pressure control  will initiate medical management.  Patient is also scheduled for sleep study for LAURENCE screening.  All questions were answered in detail.    Thank you for letting us participate in patient's care.         Diagnoses and all orders for this visit:    1. Palpitations (Primary)    2. Essential hypertension    3. Type 2 diabetes mellitus without complication, without long-term current use of insulin              Follow Up     Return in about 3 months (around 11/1/2024) for With Dr. Ribeiro.      Chvao Ribeiro MD  Interventional Cardiology  08/01/2024  09:12 EDT      Patient was given instructions and counseling regarding her condition or for health maintenance advice. Please see specific information pulled into the AVS if appropriate.     Please note that portions of this document were completed using a voice recognition program.

## 2024-09-20 ENCOUNTER — TELEPHONE (OUTPATIENT)
Dept: CARDIOLOGY | Facility: CLINIC | Age: 39
End: 2024-09-20
Payer: COMMERCIAL

## 2024-09-23 ENCOUNTER — LAB (OUTPATIENT)
Dept: LAB | Facility: HOSPITAL | Age: 39
End: 2024-09-23
Payer: COMMERCIAL

## 2024-09-23 ENCOUNTER — TRANSCRIBE ORDERS (OUTPATIENT)
Dept: LAB | Facility: HOSPITAL | Age: 39
End: 2024-09-23
Payer: COMMERCIAL

## 2024-09-23 DIAGNOSIS — M06.09 RHEUMATOID ARTHRITIS OF MULTIPLE SITES WITHOUT RHEUMATOID FACTOR: ICD-10-CM

## 2024-09-23 DIAGNOSIS — M06.09 RHEUMATOID ARTHRITIS OF MULTIPLE SITES WITHOUT RHEUMATOID FACTOR: Primary | ICD-10-CM

## 2024-09-23 LAB
ALBUMIN SERPL-MCNC: 4 G/DL (ref 3.5–5.2)
ALBUMIN/GLOB SERPL: 1.3 G/DL
ALP SERPL-CCNC: 73 U/L (ref 39–117)
ALT SERPL W P-5'-P-CCNC: 28 U/L (ref 1–33)
ANION GAP SERPL CALCULATED.3IONS-SCNC: 9.6 MMOL/L (ref 5–15)
AST SERPL-CCNC: 19 U/L (ref 1–32)
BASOPHILS # BLD AUTO: 0.04 10*3/MM3 (ref 0–0.2)
BASOPHILS NFR BLD AUTO: 0.6 % (ref 0–1.5)
BILIRUB SERPL-MCNC: 0.4 MG/DL (ref 0–1.2)
BUN SERPL-MCNC: 9 MG/DL (ref 6–20)
BUN/CREAT SERPL: 8.3 (ref 7–25)
CALCIUM SPEC-SCNC: 9.6 MG/DL (ref 8.6–10.5)
CHLORIDE SERPL-SCNC: 104 MMOL/L (ref 98–107)
CO2 SERPL-SCNC: 27.4 MMOL/L (ref 22–29)
CREAT SERPL-MCNC: 1.09 MG/DL (ref 0.57–1)
CRP SERPL-MCNC: 0.46 MG/DL (ref 0–0.5)
DEPRECATED RDW RBC AUTO: 43.2 FL (ref 37–54)
EGFRCR SERPLBLD CKD-EPI 2021: 66 ML/MIN/1.73
EOSINOPHIL # BLD AUTO: 0.16 10*3/MM3 (ref 0–0.4)
EOSINOPHIL NFR BLD AUTO: 2.2 % (ref 0.3–6.2)
ERYTHROCYTE [DISTWIDTH] IN BLOOD BY AUTOMATED COUNT: 13.4 % (ref 12.3–15.4)
ERYTHROCYTE [SEDIMENTATION RATE] IN BLOOD: 3 MM/HR (ref 0–20)
GLOBULIN UR ELPH-MCNC: 3.1 GM/DL
GLUCOSE SERPL-MCNC: 84 MG/DL (ref 65–99)
HCT VFR BLD AUTO: 41.5 % (ref 34–46.6)
HGB BLD-MCNC: 14.2 G/DL (ref 12–15.9)
IMM GRANULOCYTES # BLD AUTO: 0.02 10*3/MM3 (ref 0–0.05)
IMM GRANULOCYTES NFR BLD AUTO: 0.3 % (ref 0–0.5)
LYMPHOCYTES # BLD AUTO: 1.68 10*3/MM3 (ref 0.7–3.1)
LYMPHOCYTES NFR BLD AUTO: 23.3 % (ref 19.6–45.3)
MCH RBC QN AUTO: 30.4 PG (ref 26.6–33)
MCHC RBC AUTO-ENTMCNC: 34.2 G/DL (ref 31.5–35.7)
MCV RBC AUTO: 88.9 FL (ref 79–97)
MONOCYTES # BLD AUTO: 0.68 10*3/MM3 (ref 0.1–0.9)
MONOCYTES NFR BLD AUTO: 9.4 % (ref 5–12)
NEUTROPHILS NFR BLD AUTO: 4.62 10*3/MM3 (ref 1.7–7)
NEUTROPHILS NFR BLD AUTO: 64.2 % (ref 42.7–76)
NRBC BLD AUTO-RTO: 0 /100 WBC (ref 0–0.2)
PLATELET # BLD AUTO: 214 10*3/MM3 (ref 140–450)
PMV BLD AUTO: 10.7 FL (ref 6–12)
POTASSIUM SERPL-SCNC: 4.6 MMOL/L (ref 3.5–5.2)
PROT SERPL-MCNC: 7.1 G/DL (ref 6–8.5)
RBC # BLD AUTO: 4.67 10*6/MM3 (ref 3.77–5.28)
SODIUM SERPL-SCNC: 141 MMOL/L (ref 136–145)
WBC NRBC COR # BLD AUTO: 7.2 10*3/MM3 (ref 3.4–10.8)

## 2024-09-23 PROCEDURE — 36415 COLL VENOUS BLD VENIPUNCTURE: CPT

## 2024-09-23 PROCEDURE — 85025 COMPLETE CBC W/AUTO DIFF WBC: CPT

## 2024-09-23 PROCEDURE — 85652 RBC SED RATE AUTOMATED: CPT

## 2024-09-23 PROCEDURE — 80053 COMPREHEN METABOLIC PANEL: CPT

## 2024-09-23 PROCEDURE — 86140 C-REACTIVE PROTEIN: CPT

## 2024-09-24 DIAGNOSIS — I10 PRIMARY HYPERTENSION: Primary | ICD-10-CM

## 2024-09-24 RX ORDER — LOSARTAN POTASSIUM AND HYDROCHLOROTHIAZIDE 25; 100 MG/1; MG/1
1 TABLET ORAL DAILY
Qty: 60 TABLET | Refills: 3 | Status: SHIPPED | OUTPATIENT
Start: 2024-09-24

## 2024-11-21 ENCOUNTER — OFFICE VISIT (OUTPATIENT)
Dept: CARDIOLOGY | Facility: CLINIC | Age: 39
End: 2024-11-21
Payer: COMMERCIAL

## 2024-11-21 VITALS
WEIGHT: 264.4 LBS | DIASTOLIC BLOOD PRESSURE: 83 MMHG | HEART RATE: 78 BPM | BODY MASS INDEX: 39.16 KG/M2 | SYSTOLIC BLOOD PRESSURE: 143 MMHG | HEIGHT: 69 IN

## 2024-11-21 DIAGNOSIS — I10 ESSENTIAL HYPERTENSION: Primary | ICD-10-CM

## 2024-11-21 RX ORDER — HYDROCHLOROTHIAZIDE 25 MG/1
25 TABLET ORAL DAILY
Qty: 30 TABLET | Refills: 11 | Status: SHIPPED | OUTPATIENT
Start: 2024-11-21

## 2024-11-21 RX ORDER — LOSARTAN POTASSIUM 50 MG/1
50 TABLET ORAL DAILY
Qty: 60 TABLET | Refills: 3 | Status: SHIPPED | OUTPATIENT
Start: 2024-11-21

## 2024-11-21 NOTE — PROGRESS NOTES
[facility]  Interventional Cardiology Patient follow-up visit Note      Referring Provider:  No referring provider defined for this encounter.      History of Presenting Illness:  Paige Crandall presents to clinic today for for follow-up.  She was seen in the clinic previously for elevated blood pressure and palpitations.    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  Since February, she has noted episodes of palpitations occurring every other day lasting for few seconds.  She reports feeling stronger heartbeat followed by a weak heartbeat, which would sometimes occur frequently and make her short of breath.  She associated it with increased caffeine use and a heavy meal.  She has noted significant reduction in palpitations after minimizing her caffeine intake.      Patient was recently on a cruise trip in [facility].  Due to hot and humid weather she felt dehydrated and noted significant dizziness and reduced her losartan HCTZ combination pill dose to one half.  She is noticing blood pressure on home monitoring ranging from 130-140.  She does not report blood pressure associated headaches anymore. She also does not report presyncope, syncope, peripheral edema, angina, exertional dyspnea otherwise    She has no family history of arrhythmias or premature coronary artery disease.  She denies smoking, doing illicit drugs or drinking alcohol.      Past Medical History  Past Medical History:   Diagnosis Date    Achilles tendon pain     Allergic     Arthritis     Asthma     Diabetes mellitus     Foot pain, right     GERD (gastroesophageal reflux disease)     HL (hearing loss)     Hypertension     Obesity     Pain of right heel     Pneumonia     RLS (restless legs syndrome)          Current Outpatient Medications:     diclofenac (CATAFLAM) 50 MG tablet, Take 1 tablet by mouth Every 12 (Twelve) Hours scheduled., Disp: 180 tablet, Rfl: 3    folic acid (FOLVITE) 1 MG  "tablet, Take 2 tablets by mouth Daily., Disp: 180 tablet, Rfl: 3    hydroxychloroquine (Plaquenil) 200 MG tablet, Take 2 tablets by mouth Daily., Disp: 180 tablet, Rfl: 1    Liver Extract (LIVER PO), Take  by mouth., Disp: , Rfl:     losartan-hydrochlorothiazide (Hyzaar) 100-25 MG per tablet, Take 1 tablet by mouth Daily., Disp: 60 tablet, Rfl: 3    MAGNESIUM CHLORIDE PO, Take 2 tablets by mouth 2 (Two) Times a Day., Disp: , Rfl:     melatonin 5 MG tablet tablet, Take 1 tablet by mouth., Disp: , Rfl:     methotrexate 2.5 MG tablet, Take 5 tablets by mouth 1 (One) Time Per Week., Disp: 60 tablet, Rfl: 0    multivitamin with minerals tablet tablet, Take 1 tablet by mouth Daily., Disp: , Rfl:     ondansetron (Zofran) 4 MG tablet, Take 1 tablet by mouth Every 8 (Eight) Hours As Needed for Nausea or Vomiting., Disp: 30 tablet, Rfl: 0    folic acid (FOLVITE) 1 MG tablet, TAKE 2 TABLETS BY MOUTH DAILY (Patient not taking: Reported on 11/21/2024), Disp: 180 tablet, Rfl: 0  Current outpatient and discharge medications have been reconciled for the patient.  Reviewed by: Chavo Ribeiro MD     There are no discontinued medications.    Allergies   Allergen Reactions    Estrogens Unknown - Low Severity     Hair loss, weight gain, \"mental issues\" per pt     Lisinopril Cough    Latex Dermatitis        Social History     Tobacco Use    Smoking status: Never     Passive exposure: Never    Smokeless tobacco: Never   Vaping Use    Vaping status: Never Used   Substance Use Topics    Alcohol use: Yes     Comment: occ    Drug use: Never     Comment: Family history of substance abuse       Family History   Problem Relation Age of Onset    Diabetes Mother     Arthritis Mother     Other Mother         Degenerative Disc Disease    Obesity Mother     Diabetes Father     Hearing loss Father     Hyperlipidemia Father     Restless legs syndrome Father     Obesity Father     Miscarriages / Stillbirths Sister     Alcohol abuse Maternal Aunt     " "Asthma Maternal Aunt     Depression Maternal Aunt     Drug abuse Maternal Aunt     Liver disease Maternal Aunt     Mental illness Maternal Aunt     Thyroid disease Maternal Aunt     Other Maternal Aunt         antiphospholipid antibody syndrome    Alcohol abuse Maternal Uncle     Mental illness Maternal Uncle     Other Maternal Uncle         Degenerative Disc Disease    Developmental Disability Paternal Aunt         Born with cerebral palsy    Miscarriages / Stillbirths Paternal Aunt     Stroke Maternal Grandmother     Alcohol abuse Maternal Grandfather     Cancer Maternal Grandfather         Lung    Diabetes Maternal Grandfather     Early death Maternal Grandfather         Lung Cancer from smoking    Cancer Paternal Grandmother         Breast    Hyperlipidemia Paternal Grandmother     Vision loss Paternal Grandmother     Hearing loss Paternal Grandfather     Heart disease Paternal Grandfather     Hyperlipidemia Paternal Grandfather           Objective   /83 (BP Location: Left arm, Patient Position: Sitting, Cuff Size: Large Adult)   Pulse 78   Ht 175.3 cm (69\")   Wt 120 kg (264 lb 6.4 oz)   BMI 39.05 kg/m²     Wt Readings from Last 3 Encounters:   11/21/24 120 kg (264 lb 6.4 oz)   08/01/24 123 kg (271 lb 9.6 oz)   04/25/24 123 kg (271 lb)     BP Readings from Last 3 Encounters:   11/21/24 143/83   08/01/24 133/82   04/25/24 137/86       Physical Exam  Constitutional:  Awake. Not in acute distress. Normal appearance.   Neck: No carotid bruit, hepatojugular reflux or JVD.   Cardiovascular:      Rate and Rhythm: Normal rate and regular rhythm.      Chest Wall: PMI is not displaced.      Heart sounds: Normal heart sounds, S1 normal and S2 normal. No murmur heard.       No friction rub. No gallop. No S3 or S4 sounds.    Pulmonary: Pulmonary effort is normal. Normal breath sounds. No wheezing, rhonchi or rales.   Extremities: No Bilateral edema is noted.   Skin: Warm and dry. Non cyanotic, No petechiae or rash. " "  Neurological: Alert and oriented x 3  Psychiatric:  Behavior is cooperative.       Result Review :   The following data was reviewed by Chavo Ribeiro MD on 11/21/2024   No results found for: \"PROBNP\"  CMP          5/22/2024    09:36 6/19/2024    15:29 9/23/2024    08:40   CMP   Glucose 85  89  84    BUN 8  11  9    Creatinine 0.81  0.99  1.09    EGFR 94.2  74.1  66.0    Sodium 142  140  141    Potassium 4.4  4.6  4.6    Chloride 110  106  104    Calcium 9.3  9.0  9.6    Total Protein 6.9  6.7  7.1    Albumin 4.3  4.3  4.0    Globulin 2.6  2.4  3.1    Total Bilirubin 0.7  0.5  0.4    Alkaline Phosphatase 66  65  73    AST (SGOT) 22  15  19    ALT (SGPT) 27  27  28    Albumin/Globulin Ratio 1.7  1.8  1.3    BUN/Creatinine Ratio 9.9  11.1  8.3    Anion Gap 10.4  7.0  9.6      CBC w/diff          3/26/2024    09:56 5/22/2024    09:36 6/19/2024    15:29   CBC w/Diff   WBC 8.48  7.69  6.85    RBC 4.92  4.95  4.88    Hemoglobin 14.3  14.9  14.4    Hematocrit 41.9  43.0  42.6    MCV 85.2  86.9  87.3    MCH 29.1  30.1  29.5    MCHC 34.1  34.7  33.8    RDW 13.3  13.1  13.0    Platelets 262  296  284    Neutrophil Rel % 65.9  62.7  60.4    Immature Granulocyte Rel % 0.4  0.3  0.1    Lymphocyte Rel % 25.9  29.0  30.7    Monocyte Rel % 5.9  5.7  6.0    Eosinophil Rel % 1.3  1.4  1.9    Basophil Rel % 0.6  0.9  0.9       Lab Results   Component Value Date    TSH 1.880 03/26/2024      Lab Results   Component Value Date    FREET4 1.16 03/26/2024      No results found for: \"DDIMERQUANT\"  No results found for: \"MG\"   No results found for: \"DIGOXIN\"   No results found for: \"TROPONINT\"   No results found for: \"POCTROP\"           Lipid Panel          3/26/2024    09:56   Lipid Panel   Total Cholesterol 155    Triglycerides 55    HDL Cholesterol 59    VLDL Cholesterol 11    LDL Cholesterol  85      Results for orders placed during the hospital encounter of 05/22/24    Adult Transthoracic Echo Complete W/ Cont if Necessary Per " Protocol    Interpretation Summary  Normal ventricular systolic function.  No significant valve abnormalities noted     Results for orders placed during the hospital encounter of 05/22/24    Adult Transthoracic Echo Complete W/ Cont if Necessary Per Protocol    Interpretation Summary  Normal ventricular systolic function.  No significant valve abnormalities noted     No results found for this or any previous visit.          The 10-year ASCVD risk score (El NIXON, et al., 2019) is: 1%    Values used to calculate the score:      Age: 39 years      Sex: Female      Is Non- : No      Diabetic: Yes      Tobacco smoker: No      Systolic Blood Pressure: 143 mmHg      Is BP treated: Yes      HDL Cholesterol: 59 mg/dL      Total Cholesterol: 155 mg/dL        Assessment  Symptomatic PVCs  Essential hypertension  Screening for LAURENCE      Plan    Patient's Holter monitor, EKG and echocardiogram were reviewed.  A total of 2% PVC burden were recorded on Holter monitoring over that time span of 48 hours approximately.  As such, it is not compromising quality of life therefore patient has opted for conservative management.  Reducing caffeine intake has significantly helped patient and has reduced frequency of PVCs.    EKG does not show QT prolongation, Brugada like pattern.    Patient's blood pressure has ranged from 130s to 140s on home monitoring.  She has reduced significant reduction in blood pressure leading to dizziness after initiation of hydrochlorothiazide and losartan combination pill 25/100 once daily.  However she has felt fine after taking half dose.  Her shortness of breath has resolved and she has gradually increased her physical tolerance and is no longer having headaches related to elevated blood pressure.  We adjusted dose to losartan 50 mg once daily and hydrochlorothiazide 25 mg once daily.  Lip tingling is reported by patient and may be associated with losartan, patient was cautioned  about the angioedema and to seek emergent medical attention if lip swelling or facial swelling is noted.   Dose of hide of chlorothiazide can be reduced to 12.5 mg p.o. once daily if dizziness is noted.  Further management of blood pressure will be pursued with primary care provider.    Patient can see me on as-needed basis.    Patient is participating in aerobic activities for weight loss.  Patient is also scheduled for sleep study for LAURENCE screening.  All questions were answered in detail.    Thank you for letting us participate in patient's care.         There are no diagnoses linked to this encounter.            Follow Up     No follow-ups on file.      Chavo Ribeiro MD  Interventional Cardiology  11/21/2024  12:00 EST      Patient was given instructions and counseling regarding her condition or for health maintenance advice. Please see specific information pulled into the AVS if appropriate.     Please note that portions of this document were completed using a voice recognition program.

## 2024-12-19 ENCOUNTER — TRANSCRIBE ORDERS (OUTPATIENT)
Dept: ADMINISTRATIVE | Facility: HOSPITAL | Age: 39
End: 2024-12-19
Payer: COMMERCIAL

## 2024-12-19 ENCOUNTER — LAB (OUTPATIENT)
Facility: HOSPITAL | Age: 39
End: 2024-12-19
Payer: COMMERCIAL

## 2024-12-19 DIAGNOSIS — M06.09 RHEUMATOID ARTHRITIS OF MULTIPLE SITES WITHOUT RHEUMATOID FACTOR: ICD-10-CM

## 2024-12-19 DIAGNOSIS — M06.09 RHEUMATOID ARTHRITIS OF MULTIPLE SITES WITHOUT RHEUMATOID FACTOR: Primary | ICD-10-CM

## 2024-12-19 LAB
ALBUMIN SERPL-MCNC: 4.2 G/DL (ref 3.5–5.2)
ALBUMIN/GLOB SERPL: 1.7 G/DL
ALP SERPL-CCNC: 81 U/L (ref 39–117)
ALT SERPL W P-5'-P-CCNC: 36 U/L (ref 1–33)
ANION GAP SERPL CALCULATED.3IONS-SCNC: 7 MMOL/L (ref 5–15)
AST SERPL-CCNC: 27 U/L (ref 1–32)
BASOPHILS # BLD AUTO: 0.07 10*3/MM3 (ref 0–0.2)
BASOPHILS NFR BLD AUTO: 0.9 % (ref 0–1.5)
BILIRUB SERPL-MCNC: 0.6 MG/DL (ref 0–1.2)
BUN SERPL-MCNC: 12 MG/DL (ref 6–20)
BUN/CREAT SERPL: 12.5 (ref 7–25)
CALCIUM SPEC-SCNC: 9.3 MG/DL (ref 8.6–10.5)
CHLORIDE SERPL-SCNC: 106 MMOL/L (ref 98–107)
CO2 SERPL-SCNC: 27 MMOL/L (ref 22–29)
CREAT SERPL-MCNC: 0.96 MG/DL (ref 0.57–1)
CRP SERPL-MCNC: 0.47 MG/DL (ref 0–0.5)
DEPRECATED RDW RBC AUTO: 42.8 FL (ref 37–54)
EGFRCR SERPLBLD CKD-EPI 2021: 76.9 ML/MIN/1.73
EOSINOPHIL # BLD AUTO: 0.14 10*3/MM3 (ref 0–0.4)
EOSINOPHIL NFR BLD AUTO: 1.7 % (ref 0.3–6.2)
ERYTHROCYTE [DISTWIDTH] IN BLOOD BY AUTOMATED COUNT: 13.2 % (ref 12.3–15.4)
ERYTHROCYTE [SEDIMENTATION RATE] IN BLOOD: 3 MM/HR (ref 0–20)
GLOBULIN UR ELPH-MCNC: 2.5 GM/DL
GLUCOSE SERPL-MCNC: 88 MG/DL (ref 65–99)
HCT VFR BLD AUTO: 41.8 % (ref 34–46.6)
HGB BLD-MCNC: 14.6 G/DL (ref 12–15.9)
IMM GRANULOCYTES # BLD AUTO: 0.02 10*3/MM3 (ref 0–0.05)
IMM GRANULOCYTES NFR BLD AUTO: 0.2 % (ref 0–0.5)
LYMPHOCYTES # BLD AUTO: 2.17 10*3/MM3 (ref 0.7–3.1)
LYMPHOCYTES NFR BLD AUTO: 27 % (ref 19.6–45.3)
MCH RBC QN AUTO: 31.2 PG (ref 26.6–33)
MCHC RBC AUTO-ENTMCNC: 34.9 G/DL (ref 31.5–35.7)
MCV RBC AUTO: 89.3 FL (ref 79–97)
MONOCYTES # BLD AUTO: 0.58 10*3/MM3 (ref 0.1–0.9)
MONOCYTES NFR BLD AUTO: 7.2 % (ref 5–12)
NEUTROPHILS NFR BLD AUTO: 5.07 10*3/MM3 (ref 1.7–7)
NEUTROPHILS NFR BLD AUTO: 63 % (ref 42.7–76)
NRBC BLD AUTO-RTO: 0 /100 WBC (ref 0–0.2)
PLATELET # BLD AUTO: 293 10*3/MM3 (ref 140–450)
PMV BLD AUTO: 10.5 FL (ref 6–12)
POTASSIUM SERPL-SCNC: 4.5 MMOL/L (ref 3.5–5.2)
PROT SERPL-MCNC: 6.7 G/DL (ref 6–8.5)
RBC # BLD AUTO: 4.68 10*6/MM3 (ref 3.77–5.28)
SODIUM SERPL-SCNC: 140 MMOL/L (ref 136–145)
WBC NRBC COR # BLD AUTO: 8.05 10*3/MM3 (ref 3.4–10.8)

## 2024-12-19 PROCEDURE — 86140 C-REACTIVE PROTEIN: CPT

## 2024-12-19 PROCEDURE — 85025 COMPLETE CBC W/AUTO DIFF WBC: CPT

## 2024-12-19 PROCEDURE — 85652 RBC SED RATE AUTOMATED: CPT

## 2024-12-19 PROCEDURE — 80053 COMPREHEN METABOLIC PANEL: CPT

## 2024-12-19 PROCEDURE — 36415 COLL VENOUS BLD VENIPUNCTURE: CPT

## 2025-03-19 ENCOUNTER — TRANSCRIBE ORDERS (OUTPATIENT)
Dept: ADMINISTRATIVE | Facility: HOSPITAL | Age: 40
End: 2025-03-19
Payer: COMMERCIAL

## 2025-03-19 ENCOUNTER — LAB (OUTPATIENT)
Facility: HOSPITAL | Age: 40
End: 2025-03-19
Payer: COMMERCIAL

## 2025-03-19 DIAGNOSIS — Z79.899 ENCOUNTER FOR LONG-TERM (CURRENT) USE OF MEDICATIONS: ICD-10-CM

## 2025-03-19 DIAGNOSIS — M06.09 RHEUMATOID ARTHRITIS OF MULTIPLE SITES WITHOUT RHEUMATOID FACTOR: Primary | ICD-10-CM

## 2025-03-19 DIAGNOSIS — M06.09 RHEUMATOID ARTHRITIS OF MULTIPLE SITES WITHOUT RHEUMATOID FACTOR: ICD-10-CM

## 2025-03-19 LAB
ALBUMIN SERPL-MCNC: 4.2 G/DL (ref 3.5–5.2)
ALBUMIN/GLOB SERPL: 1.6 G/DL
ALP SERPL-CCNC: 71 U/L (ref 39–117)
ALT SERPL W P-5'-P-CCNC: 24 U/L (ref 1–33)
ANION GAP SERPL CALCULATED.3IONS-SCNC: 9 MMOL/L (ref 5–15)
AST SERPL-CCNC: 20 U/L (ref 1–32)
BASOPHILS # BLD AUTO: 0.08 10*3/MM3 (ref 0–0.2)
BASOPHILS NFR BLD AUTO: 0.9 % (ref 0–1.5)
BILIRUB SERPL-MCNC: 0.5 MG/DL (ref 0–1.2)
BUN SERPL-MCNC: 11 MG/DL (ref 6–20)
BUN/CREAT SERPL: 12.6 (ref 7–25)
CALCIUM SPEC-SCNC: 9 MG/DL (ref 8.6–10.5)
CHLORIDE SERPL-SCNC: 106 MMOL/L (ref 98–107)
CO2 SERPL-SCNC: 22 MMOL/L (ref 22–29)
CREAT SERPL-MCNC: 0.87 MG/DL (ref 0.57–1)
CRP SERPL-MCNC: 0.52 MG/DL (ref 0–0.5)
DEPRECATED RDW RBC AUTO: 41.7 FL (ref 37–54)
EGFRCR SERPLBLD CKD-EPI 2021: 86.5 ML/MIN/1.73
EOSINOPHIL # BLD AUTO: 0.19 10*3/MM3 (ref 0–0.4)
EOSINOPHIL NFR BLD AUTO: 2 % (ref 0.3–6.2)
ERYTHROCYTE [DISTWIDTH] IN BLOOD BY AUTOMATED COUNT: 12.9 % (ref 12.3–15.4)
ERYTHROCYTE [SEDIMENTATION RATE] IN BLOOD: 8 MM/HR (ref 0–20)
GLOBULIN UR ELPH-MCNC: 2.6 GM/DL
GLUCOSE SERPL-MCNC: 92 MG/DL (ref 65–99)
HCT VFR BLD AUTO: 41.8 % (ref 34–46.6)
HGB BLD-MCNC: 14.4 G/DL (ref 12–15.9)
IMM GRANULOCYTES # BLD AUTO: 0.03 10*3/MM3 (ref 0–0.05)
IMM GRANULOCYTES NFR BLD AUTO: 0.3 % (ref 0–0.5)
LYMPHOCYTES # BLD AUTO: 2.27 10*3/MM3 (ref 0.7–3.1)
LYMPHOCYTES NFR BLD AUTO: 24.5 % (ref 19.6–45.3)
MCH RBC QN AUTO: 30.4 PG (ref 26.6–33)
MCHC RBC AUTO-ENTMCNC: 34.4 G/DL (ref 31.5–35.7)
MCV RBC AUTO: 88.4 FL (ref 79–97)
MONOCYTES # BLD AUTO: 0.74 10*3/MM3 (ref 0.1–0.9)
MONOCYTES NFR BLD AUTO: 8 % (ref 5–12)
NEUTROPHILS NFR BLD AUTO: 5.96 10*3/MM3 (ref 1.7–7)
NEUTROPHILS NFR BLD AUTO: 64.3 % (ref 42.7–76)
NRBC BLD AUTO-RTO: 0 /100 WBC (ref 0–0.2)
PLATELET # BLD AUTO: 302 10*3/MM3 (ref 140–450)
PMV BLD AUTO: 10.9 FL (ref 6–12)
POTASSIUM SERPL-SCNC: 4 MMOL/L (ref 3.5–5.2)
PROT SERPL-MCNC: 6.8 G/DL (ref 6–8.5)
RBC # BLD AUTO: 4.73 10*6/MM3 (ref 3.77–5.28)
SODIUM SERPL-SCNC: 137 MMOL/L (ref 136–145)
WBC NRBC COR # BLD AUTO: 9.27 10*3/MM3 (ref 3.4–10.8)

## 2025-03-19 PROCEDURE — 80053 COMPREHEN METABOLIC PANEL: CPT

## 2025-03-19 PROCEDURE — 86140 C-REACTIVE PROTEIN: CPT

## 2025-03-19 PROCEDURE — 36415 COLL VENOUS BLD VENIPUNCTURE: CPT

## 2025-03-19 PROCEDURE — 85025 COMPLETE CBC W/AUTO DIFF WBC: CPT

## 2025-03-19 PROCEDURE — 85652 RBC SED RATE AUTOMATED: CPT

## 2025-07-02 ENCOUNTER — LAB (OUTPATIENT)
Facility: HOSPITAL | Age: 40
End: 2025-07-02
Payer: COMMERCIAL

## 2025-07-02 ENCOUNTER — TRANSCRIBE ORDERS (OUTPATIENT)
Dept: ADMINISTRATIVE | Facility: HOSPITAL | Age: 40
End: 2025-07-02
Payer: COMMERCIAL

## 2025-07-02 DIAGNOSIS — M06.09 RHEUMATOID ARTHRITIS OF MULTIPLE SITES WITHOUT RHEUMATOID FACTOR: ICD-10-CM

## 2025-07-02 DIAGNOSIS — M06.09 RHEUMATOID ARTHRITIS OF MULTIPLE SITES WITHOUT RHEUMATOID FACTOR: Primary | ICD-10-CM

## 2025-07-02 LAB
ALBUMIN SERPL-MCNC: 4.1 G/DL (ref 3.5–5.2)
ALBUMIN/GLOB SERPL: 1.6 G/DL
ALP SERPL-CCNC: 74 U/L (ref 39–117)
ALT SERPL W P-5'-P-CCNC: 25 U/L (ref 1–33)
ANION GAP SERPL CALCULATED.3IONS-SCNC: 9.4 MMOL/L (ref 5–15)
AST SERPL-CCNC: 26 U/L (ref 1–32)
BASOPHILS # BLD AUTO: 0.06 10*3/MM3 (ref 0–0.2)
BASOPHILS NFR BLD AUTO: 0.7 % (ref 0–1.5)
BILIRUB SERPL-MCNC: 0.5 MG/DL (ref 0–1.2)
BUN SERPL-MCNC: 10 MG/DL (ref 6–20)
BUN/CREAT SERPL: 10.6 (ref 7–25)
CALCIUM SPEC-SCNC: 9.3 MG/DL (ref 8.6–10.5)
CHLORIDE SERPL-SCNC: 105 MMOL/L (ref 98–107)
CO2 SERPL-SCNC: 24.6 MMOL/L (ref 22–29)
CREAT SERPL-MCNC: 0.94 MG/DL (ref 0.57–1)
CRP SERPL-MCNC: 0.42 MG/DL (ref 0–0.5)
DEPRECATED RDW RBC AUTO: 42.4 FL (ref 37–54)
EGFRCR SERPLBLD CKD-EPI 2021: 78.3 ML/MIN/1.73
EOSINOPHIL # BLD AUTO: 0.19 10*3/MM3 (ref 0–0.4)
EOSINOPHIL NFR BLD AUTO: 2.3 % (ref 0.3–6.2)
ERYTHROCYTE [DISTWIDTH] IN BLOOD BY AUTOMATED COUNT: 13.2 % (ref 12.3–15.4)
ERYTHROCYTE [SEDIMENTATION RATE] IN BLOOD: 7 MM/HR (ref 0–20)
GLOBULIN UR ELPH-MCNC: 2.6 GM/DL
GLUCOSE SERPL-MCNC: 95 MG/DL (ref 65–99)
HCT VFR BLD AUTO: 42.9 % (ref 34–46.6)
HGB BLD-MCNC: 14.7 G/DL (ref 12–15.9)
IMM GRANULOCYTES # BLD AUTO: 0.03 10*3/MM3 (ref 0–0.05)
IMM GRANULOCYTES NFR BLD AUTO: 0.4 % (ref 0–0.5)
LYMPHOCYTES # BLD AUTO: 2.12 10*3/MM3 (ref 0.7–3.1)
LYMPHOCYTES NFR BLD AUTO: 25.4 % (ref 19.6–45.3)
MCH RBC QN AUTO: 30.2 PG (ref 26.6–33)
MCHC RBC AUTO-ENTMCNC: 34.3 G/DL (ref 31.5–35.7)
MCV RBC AUTO: 88.3 FL (ref 79–97)
MONOCYTES # BLD AUTO: 0.52 10*3/MM3 (ref 0.1–0.9)
MONOCYTES NFR BLD AUTO: 6.2 % (ref 5–12)
NEUTROPHILS NFR BLD AUTO: 5.43 10*3/MM3 (ref 1.7–7)
NEUTROPHILS NFR BLD AUTO: 65 % (ref 42.7–76)
NRBC BLD AUTO-RTO: 0 /100 WBC (ref 0–0.2)
PLATELET # BLD AUTO: 303 10*3/MM3 (ref 140–450)
PMV BLD AUTO: 10.8 FL (ref 6–12)
POTASSIUM SERPL-SCNC: 4.3 MMOL/L (ref 3.5–5.2)
PROT SERPL-MCNC: 6.7 G/DL (ref 6–8.5)
RBC # BLD AUTO: 4.86 10*6/MM3 (ref 3.77–5.28)
SODIUM SERPL-SCNC: 139 MMOL/L (ref 136–145)
WBC NRBC COR # BLD AUTO: 8.35 10*3/MM3 (ref 3.4–10.8)

## 2025-07-02 PROCEDURE — 80053 COMPREHEN METABOLIC PANEL: CPT

## 2025-07-02 PROCEDURE — 85025 COMPLETE CBC W/AUTO DIFF WBC: CPT

## 2025-07-02 PROCEDURE — 36415 COLL VENOUS BLD VENIPUNCTURE: CPT

## 2025-07-02 PROCEDURE — 85652 RBC SED RATE AUTOMATED: CPT

## 2025-07-02 PROCEDURE — 86140 C-REACTIVE PROTEIN: CPT
